# Patient Record
Sex: FEMALE | Race: ASIAN | NOT HISPANIC OR LATINO | Employment: FULL TIME | URBAN - METROPOLITAN AREA
[De-identification: names, ages, dates, MRNs, and addresses within clinical notes are randomized per-mention and may not be internally consistent; named-entity substitution may affect disease eponyms.]

---

## 2018-06-28 ENCOUNTER — OFFICE VISIT (OUTPATIENT)
Dept: MEDICAL GROUP | Facility: MEDICAL CENTER | Age: 38
End: 2018-06-28
Payer: COMMERCIAL

## 2018-06-28 VITALS
OXYGEN SATURATION: 100 % | TEMPERATURE: 98.9 F | HEIGHT: 66 IN | HEART RATE: 80 BPM | DIASTOLIC BLOOD PRESSURE: 62 MMHG | BODY MASS INDEX: 33.3 KG/M2 | SYSTOLIC BLOOD PRESSURE: 98 MMHG | WEIGHT: 207.23 LBS

## 2018-06-28 DIAGNOSIS — Z00.00 ANNUAL PHYSICAL EXAM: ICD-10-CM

## 2018-06-28 DIAGNOSIS — E55.9 HYPOVITAMINOSIS D: ICD-10-CM

## 2018-06-28 DIAGNOSIS — Z87.42 HX OF OVARIAN CYST: ICD-10-CM

## 2018-06-28 DIAGNOSIS — E66.9 OBESITY (BMI 30.0-34.9): ICD-10-CM

## 2018-06-28 DIAGNOSIS — Z00.00 HEALTH CARE MAINTENANCE: ICD-10-CM

## 2018-06-28 DIAGNOSIS — R74.01 ELEVATED TRANSAMINASE LEVEL: ICD-10-CM

## 2018-06-28 DIAGNOSIS — D72.829 LEUKOCYTOSIS, UNSPECIFIED TYPE: ICD-10-CM

## 2018-06-28 DIAGNOSIS — Z90.721 HX OF UNILATERAL OOPHORECTOMY: ICD-10-CM

## 2018-06-28 DIAGNOSIS — Z76.89 ENCOUNTER TO ESTABLISH CARE: ICD-10-CM

## 2018-06-28 PROBLEM — E66.811 OBESITY (BMI 30.0-34.9): Status: ACTIVE | Noted: 2018-06-28

## 2018-06-28 PROCEDURE — 99385 PREV VISIT NEW AGE 18-39: CPT | Mod: 25 | Performed by: INTERNAL MEDICINE

## 2018-06-28 PROCEDURE — 99204 OFFICE O/P NEW MOD 45 MIN: CPT | Performed by: INTERNAL MEDICINE

## 2018-06-28 ASSESSMENT — PATIENT HEALTH QUESTIONNAIRE - PHQ9: CLINICAL INTERPRETATION OF PHQ2 SCORE: 0

## 2018-06-28 NOTE — PROGRESS NOTES
CHIEF COMPLIANT:  Izzy Mills is a 37 y.o. female who presents for annual exam and discuss medical problems    Pt has GYN provider: yes    Recommendations:  Regular exercise at least 4 days a week  Diet: advised balanced diet  Dental exam at least 1-2 times per year  Sunscreen use: advised    Immunizations:  TdaP:  2014    GYN  Last PAP:   2016, normal   Abnormal PAP: no    Menarche:      Menses every month with bleeding for 3 days  No excess cramping or bleeding.   Takes OTC analgesics for cramping  Contraception: condoms    OBESITY, Body mass index is 33.45 kg/m².  Onset: since 2016  Diet:  vegetarian  Exercise: insufficient  No temperature intolerance. No change in hair/skin quality, BMs.   No HTN, buffalo hump, purple striae, flushing.  FH of obesity: mother    Hypovitaminosis D  The patient had low vitamin D level, treated with prescription dose.  No current supplement.    Elevated transaminases  The patient had elevated transaminases in the past.   She has not have fatigue, fever, abdominal pain, change in BM habits.  We do not have LFTs in Epic.    Leukocytosis  The patient had elevated WBC.   Denies fever, chills, anorexia, weight loss, lymphadenopathy.    H/o left ovarian cyst / oophorectomy  The patient had left ovarian cyst, status post oophorectomy on the left side in 2016.    CURRENT MEDICATIONS  Current Outpatient Prescriptions   Medication Sig Dispense Refill   • Cholecalciferol (VITAMIN D3) 51373 UNITS Cap Take 1 Cap by mouth every 7 days.     • Multiple Vitamins-Calcium (ONE-A-DAY WOMENS FORMULA PO) Take 1 Tab by mouth every day.       No current facility-administered medications for this visit.      ALLERGIES  Allergies: Patient has no known allergies.  PAST MEDICAL HISTORY  She  has a past medical history of Gynecological disorder; Jaundice; and Pain (09-).  SURGICAL HISTORY  She  has a past surgical history that includes dilation and curettage (); pelviscopy (N/A,  9/29/2016); ovarian cystectomy (Left, 9/29/2016); and oophorectomy (Left, 9/29/2016).  SOCIAL HISTORY  Social History   Substance Use Topics   • Smoking status: Never Smoker   • Smokeless tobacco: Never Used   • Alcohol use 0.0 oz/week      Comment: 1-2 per 6 months     Social History     Social History Narrative   • No narrative on file     FAMILY HISTORY  Family History   Problem Relation Age of Onset   • Diabetes Mother    • Asthma Mother    • Diabetes Father    • Prostate enlargement (BPH) Father    • Asthma Sister      Family Status   Relation Status   • Mother Alive   • Father Alive   • Sister Alive     REVIEW OF SYSTEMS  Constitutional: Denies fever, chills, or sweats  Skin: negative for rash, scaling, itching, pigmentation, hair or nail changes.  Eyes: negative for visual blurring, double vision, eye pain, floaters and discharge from eyes  ENT: negative for tinnitus, vertigo, bleeding gums, dental problem and hoarseness, frequent URI's, sinus trouble, persistent sore throat  Respiratory: negative for persistent cough, hemoptysis, dyspnea, recurrent pneumonia or bronchitis, asthma and wheezing  Cardiovascular: negative for palpitations, tachycardia, irregular heart beat, chest pain, or peripheral edema.  Gastrointestinal: negative for poor appetite, dysphagia, nausea, heartburn or reflux, abdominal pain, hemorrhoids, constipation or diarrhea. And per HPI.  Genitourinary: negative for dysuria, frequency, hesitancy, incontinence, sexually transmitted disease, abnormal vaginal discharge/bleeding, dysparunia. And per HPI.  Musculoskeletal: negative for joint swelling and muscle pain/ soreness  Neuro: negative for migraine headaches, involuntary movements or tremor  Psychiatric: negative for excessive alcohol consumption or illegal drug use, sleep problems, anxiety, depression, sexual difficulties  Hematologic/Lymphatic/Immunologic: negative for anemia, unusual bruising, swollen glands, HIV risk factors. And per  "HPI.  Endocrine: negative for temperature intolerance, polydipsia, polyuria.     PHYSICAL EXAMINATION:  Blood pressure (!) 98/62, pulse 80, temperature 37.2 °C (98.9 °F), height 1.676 m (5' 6\"), weight 94 kg (207 lb 3.7 oz), SpO2 100 %.  Body mass index is 33.45 kg/m².  Wt Readings from Last 4 Encounters:   06/28/18 94 kg (207 lb 3.7 oz)   09/29/16 79 kg (174 lb 2.6 oz)     General appearance:healthy, well developed, well nourished, well-groomed  Psych: alert, no distress, cooperative. Eye contact good, speech goal directed. Affect calm.   Eyes: conjunctivae and sclerae normal, lids and lashes normal, EOMI, PERRLA  ENT: Ears: external ears normal to inspection, canals clear. TMs pearly gray with normal light reflex and anatomic landmarks, Nose/Sinuses: nose shows no deformity, asymmetry, or inflammation, nasal mucosa normal, no sinus tenderness,   Oropharynx: lips normal without lesions, buccal mucosa normal, gums healthy, teeth intact, non-carious, palate normal, tongue midline and normal  Neck: no asymmetry, masses, adenopathy. Thyroid normal to palpation, carotids without bruits, no jugular venous distention  Lungs: clear to auscultation with good excursion, Normal respiratory rate. Chest symmetric no chest wall tenderness.  Cardiovascular: Regular rate and rhythm, no murmur.  No peripheral edema  Abdomen:  Soft, non-tender, no masses, HSM or palpable renal abnormalities. Bowel sounds normal, no bruits heard. No CVAT.  Musculoskeletal: no evidence of joint effusion, ROM of all joints is normal, no crepitation detected, strength grossly normal UE and LE, proximal and distal motor groups. No deformities present  Lymphatic: None significantly enlarged supraclavicular, axillary, or inguinal  Skin: color normal, vascularity normal, no rashes or suspicious lesions, no evidence of bleeding or bruising  Neuro: speech normal, mental status intact, gait grossly normal, muscle tone normal.    LABS  Reviewed and " discussed:  No results found for: CHOLSTRLTOT, LDL, HDL, TRIGLYCERIDE    Lab Results   Component Value Date/Time    SODIUM 138 09/20/2016 04:20 PM    POTASSIUM 3.8 09/20/2016 04:20 PM    CHLORIDE 107 09/20/2016 04:20 PM    CO2 24 09/20/2016 04:20 PM    GLUCOSE 89 09/20/2016 04:20 PM    BUN 9 09/20/2016 04:20 PM    CREATININE 0.77 09/20/2016 04:20 PM     ASSESSMENT/PLAN    1. Annual physical exam  Reviewed PMH, PSH, FH, SH, ALL, MEDS, HCM/IMM.   Advised healthy habits, diet, exercise.    2. Health care maintenance  Pending records  - TSH; Future    3. Encounter to establish care  Release form for old records: signed    4. Obesity (BMI 30.0-34.9)  - Patient identified as having weight management issue.  Appropriate orders and counseling given.  - TSH; Future    5. Hypovitaminosis D  Pending labs  - VITAMIN D,25 HYDROXY; Future    6. Elevated transaminase level  Pending labs  - COMP METABOLIC PANEL; Future  - LIPID PROFILE; Future    7. Leukocytosis, unspecified type  Pending labs  - CBC WITH DIFFERENTIAL; Future    8. Hx of unilateral oophorectomy  No sx.    9. Hx of ovarian cyst  No sx.    Smoking Counseling: Nonsmoker    Next office visit is due in  1 year    All questions are answered.     Please note that this dictation was created using voice recognition software. Despite all efforts, some minor grammar mistakes are possible.

## 2018-08-07 ENCOUNTER — OFFICE VISIT (OUTPATIENT)
Dept: MEDICAL GROUP | Facility: MEDICAL CENTER | Age: 38
End: 2018-08-07
Payer: COMMERCIAL

## 2018-08-07 VITALS
TEMPERATURE: 98.8 F | DIASTOLIC BLOOD PRESSURE: 64 MMHG | HEART RATE: 73 BPM | WEIGHT: 198.19 LBS | BODY MASS INDEX: 31.85 KG/M2 | HEIGHT: 66 IN | SYSTOLIC BLOOD PRESSURE: 100 MMHG | OXYGEN SATURATION: 97 %

## 2018-08-07 DIAGNOSIS — R71.8 MICROCYTOSIS: ICD-10-CM

## 2018-08-07 DIAGNOSIS — R73.01 IFG (IMPAIRED FASTING GLUCOSE): ICD-10-CM

## 2018-08-07 DIAGNOSIS — Z00.00 HEALTH CARE MAINTENANCE: ICD-10-CM

## 2018-08-07 DIAGNOSIS — E66.9 OBESITY (BMI 30.0-34.9): ICD-10-CM

## 2018-08-07 DIAGNOSIS — K59.4 PROCTALGIA FUGAX: ICD-10-CM

## 2018-08-07 DIAGNOSIS — E55.9 HYPOVITAMINOSIS D: ICD-10-CM

## 2018-08-07 PROCEDURE — 99214 OFFICE O/P EST MOD 30 MIN: CPT | Performed by: INTERNAL MEDICINE

## 2018-08-07 RX ORDER — FERROUS SULFATE 325(65) MG
325 TABLET ORAL 2 TIMES DAILY
Qty: 180 TAB | Refills: 0 | Status: SHIPPED | OUTPATIENT
Start: 2018-08-07 | End: 2020-01-13

## 2018-08-07 NOTE — PROGRESS NOTES
CHIEF COMPLAINT  Chief Complaint   Patient presents with   • Results     Lab results   • Rectal Pain     Wants colonscopy      HPI  Patient is a 37 y.o. female patient who presents today for the following     Proctalgia fugax  The patient has had intermittent severe rectal pain; it could awake her from sleep if happens overnight.   She has not have change in BMs, hematochezia/mucus in stool, anorexa, WT loss.   She was seen by GI, recommended colonoscopy.    Microcytosis  The patient had slightly lower MCV, without anemia.   She has have heavy periods.   Denies fatigue, anorexia, dizziness, lightheadedness.    IFG  The patient had elevated FBG at 107.  No polydipsia, polyphagia, polyuria.  No abdominal pain, weight loss, fatigue.  Diet: Regular  Exercise: Insufficient  BMI: 31  FH of DM: Parents    Hypovitaminosis D  The patient had low vitamin D level, treated with high-dose of vitamin D, finished in 3/2018.  No current vitamin D supplement.    OBESITY, BMI 31, was 33  Onset: since 2016  Diet: vegetarian  Exercise: insufficient  No temperature intolerance. No change in hair/skin quality, BMs.   No HTN, buffalo hump, purple striae, flushing.  FH of obesity: mother    Reviewed PMH, PSH, FH, SH, ALL, HCM/IMM  Reviewed MEDS    Patient Active Problem List    Diagnosis Date Noted   • Proctalgia fugax 08/07/2018   • IFG (impaired fasting glucose) 08/07/2018   • Health care maintenance 06/28/2018   • Obesity (BMI 30.0-34.9) 06/28/2018   • Ovarian retention cyst 09/29/2016     CURRENT MEDICATIONS  Current Outpatient Prescriptions   Medication Sig Dispense Refill   • ferrous sulfate 325 (65 Fe) MG tablet Take 1 Tab by mouth 2 Times a Day. 180 Tab 0   • Cholecalciferol (VITAMIN D3) 32316 UNITS Cap Take 1 Cap by mouth every 7 days.     • Multiple Vitamins-Calcium (ONE-A-DAY WOMENS FORMULA PO) Take 1 Tab by mouth every day.       No current facility-administered medications for this visit.      ALLERGIES  Allergies: Patient has  "no known allergies.  PAST MEDICAL HISTORY  Past Medical History:   Diagnosis Date   • Pain 09-    pelvic pain, 2/10   • Gynecological disorder     ovarian cyst   • Jaundice     at birth     SURGICAL HISTORY  She  has a past surgical history that includes dilation and curettage (); pelviscopy (N/A, 9/29/2016); ovarian cystectomy (Left, 9/29/2016); and oophorectomy (Left, 9/29/2016).  SOCIAL HISTORY  Social History   Substance Use Topics   • Smoking status: Never Smoker   • Smokeless tobacco: Never Used   • Alcohol use 0.0 oz/week      Comment: 1-2 per 6 months     Social History     Social History Narrative   • No narrative on file     FAMILY HISTORY  Family History   Problem Relation Age of Onset   • Diabetes Mother    • Asthma Mother    • Diabetes Father    • Prostate enlargement (BPH) Father    • Asthma Sister      Family Status   Relation Status   • Mo Alive   • Fa Alive   • Sis Alive     ROS   Constitutional: Negative for fever, chills.  HENT: Negative for congestion, sore throat.  Eyes: Negative for blurred vision.   Respiratory: Negative for cough, shortness of breath.  Cardiovascular: Negative for chest pain, palpitations.   Gastrointestinal: Negative for heartburn, nausea, and as above.  Genitourinary: Negative for dysuria.  Musculoskeletal: Negative for significant myalgias, back pain and joint pain.   Skin: Negative for rash and itching.   Neuro: Negative for dizziness, weakness and headaches.   Endo/Heme/Allergies: Does not bruise/bleed easily.   Psychiatric/Behavioral: Negative for depression, anxiety    PHYSICAL EXAM   Blood pressure 100/64, pulse 73, temperature 37.1 °C (98.8 °F), height 1.676 m (5' 6\"), weight 89.9 kg (198 lb 3.1 oz), SpO2 97 %. Body mass index is 31.99 kg/m².  General:  NAD, well appearing  HEENT:   NC/AT, PERRLA, EOMI, TMs are clear. Oropharyngeal mucosa is pink,  without lesions;  no cervical / supraclavicular  lymphadenopathy, no thyromegaly.    Cardiovascular: RRR.  "  No m/r/g. No carotid bruits .      Lungs:   CTAB, no w/r/r, no respiratory distress.  Abdomen: Soft, NT/ND + BS; no suprapubic tenderness; no masses or hepatosplenomegaly.  Extremities:  2+ DP and radial pulses bilaterally.  No c/c/e.   Skin:  Warm, dry.  No erythema. No rash.   Neurologic: Alert & oriented x 3.  No focal deficits.  Psychiatric:  Affect normal, mood normal, judgment normal.    LABS     Labs are reviewed and discussed with a patient  Lab Results   Component Value Date/Time    CHOLSTRLTOT 168 07/07/2018 08:56 AM    LDL 88 07/07/2018 08:56 AM    HDL 54 07/07/2018 08:56 AM    TRIGLYCERIDE 128 07/07/2018 08:56 AM       Lab Results   Component Value Date/Time    SODIUM 140 07/07/2018 08:56 AM    POTASSIUM 4.0 07/07/2018 08:56 AM    CHLORIDE 106 07/07/2018 08:56 AM    CO2 26 07/07/2018 08:56 AM    GLUCOSE 107 (H) 07/07/2018 08:56 AM    BUN 11 07/07/2018 08:56 AM    CREATININE 0.80 07/07/2018 08:56 AM     Lab Results   Component Value Date/Time    ALKPHOSPHAT 79 07/07/2018 08:56 AM    ASTSGOT 18 07/07/2018 08:56 AM    ALTSGPT 18 07/07/2018 08:56 AM    TBILIRUBIN 0.6 07/07/2018 08:56 AM      Lab Results   Component Value Date/Time    WBC 6.8 07/07/2018 08:56 AM    RBC 4.85 07/07/2018 08:56 AM    HEMOGLOBIN 13.2 07/07/2018 08:56 AM    HEMATOCRIT 39.4 07/07/2018 08:56 AM    MCV 81.2 (L) 07/07/2018 08:56 AM    MCH 27.2 07/07/2018 08:56 AM    MCHC 33.5 (L) 07/07/2018 08:56 AM    MPV 9.5 07/07/2018 08:56 AM    NEUTSPOLYS 54.70 07/07/2018 08:56 AM    LYMPHOCYTES 34.60 07/07/2018 08:56 AM    MONOCYTES 7.00 07/07/2018 08:56 AM    EOSINOPHILS 2.80 07/07/2018 08:56 AM    BASOPHILS 0.60 07/07/2018 08:56 AM      IMAGING     None    ASSESMENT AND PLAN        1. Proctalgia fugax  - REFERRAL TO GASTROENTEROLOGY    2. Microcytosis  Start iron for 3 months then follow-up labs  - ferrous sulfate 325 (65 Fe) MG tablet; Take 1 Tab by mouth 2 Times a Day.  Dispense: 180 Tab; Refill: 0  - CBC WITH DIFFERENTIAL; Future  -  IRON/TOTAL IRON BIND; Future    3. IFG (impaired fasting glucose)  Discussed about risk to develop DM.   Advised low carb diet, exercise, watch for WT.   A1c/CMP in not earlier than 6 months    4. Hypovitaminosis D  Advised to take 2000 units of vitamin D daily, OTC  - VITAMIN D,25 HYDROXY; Future    5. Obesity (BMI 30.0-34.9)  Appropriate counseling given    6. Health care maintenance  Pending records for Pap smear.    Counseling:   - Smoking:  Nonsmoker    Followup: Return in about 1 year (around 8/7/2019) for Short.    All questions are answered.    Please note that this dictation was created using voice recognition software, and that there might be errors of autumn and possibly content.

## 2019-05-14 ENCOUNTER — OFFICE VISIT (OUTPATIENT)
Dept: MEDICAL GROUP | Facility: MEDICAL CENTER | Age: 39
End: 2019-05-14
Payer: COMMERCIAL

## 2019-05-14 VITALS
SYSTOLIC BLOOD PRESSURE: 102 MMHG | HEIGHT: 66 IN | BODY MASS INDEX: 33.41 KG/M2 | OXYGEN SATURATION: 99 % | WEIGHT: 207.89 LBS | TEMPERATURE: 97.6 F | DIASTOLIC BLOOD PRESSURE: 68 MMHG | HEART RATE: 79 BPM

## 2019-05-14 DIAGNOSIS — E66.9 OBESITY (BMI 30.0-34.9): ICD-10-CM

## 2019-05-14 DIAGNOSIS — H81.10 BENIGN PAROXYSMAL POSITIONAL VERTIGO, UNSPECIFIED LATERALITY: ICD-10-CM

## 2019-05-14 DIAGNOSIS — I95.89 HYPOTENSION, CHRONIC: ICD-10-CM

## 2019-05-14 DIAGNOSIS — M62.838 MUSCLE SPASM: ICD-10-CM

## 2019-05-14 DIAGNOSIS — R11.0 NAUSEA: ICD-10-CM

## 2019-05-14 DIAGNOSIS — R51.9 NONINTRACTABLE EPISODIC HEADACHE, UNSPECIFIED HEADACHE TYPE: ICD-10-CM

## 2019-05-14 DIAGNOSIS — R42 LIGHT-HEADEDNESS: ICD-10-CM

## 2019-05-14 PROCEDURE — 99214 OFFICE O/P EST MOD 30 MIN: CPT | Performed by: INTERNAL MEDICINE

## 2019-05-14 RX ORDER — NAPROXEN 500 MG/1
500 TABLET ORAL 2 TIMES DAILY WITH MEALS
Qty: 60 TAB | Refills: 0 | Status: SHIPPED | OUTPATIENT
Start: 2019-05-14 | End: 2020-01-13

## 2019-05-14 RX ORDER — SUMATRIPTAN 25 MG/1
25-100 TABLET, FILM COATED ORAL
Qty: 10 TAB | Refills: 3 | Status: SHIPPED | OUTPATIENT
Start: 2019-05-14 | End: 2020-01-13

## 2019-05-14 RX ORDER — TIZANIDINE 4 MG/1
4 TABLET ORAL EVERY 6 HOURS PRN
Qty: 30 TAB | Refills: 3 | Status: SHIPPED | OUTPATIENT
Start: 2019-05-14 | End: 2020-01-13

## 2019-05-14 ASSESSMENT — PATIENT HEALTH QUESTIONNAIRE - PHQ9: CLINICAL INTERPRETATION OF PHQ2 SCORE: 0

## 2019-05-14 NOTE — PROGRESS NOTES
CHIEF COMPLAINT  Chief Complaint   Patient presents with   • Headache     x 1 week     HPI  Patient is a 38 y.o. female patient who presents today for the following     HA, hypotension, N  Lightheadedness, muscle spasm obesity  38-year-old, female with history of hypertension and obesity, developed headache that lasted 4 days, now still complains of lightheadedness, muscle spasm of the neck, initial nausea and dizziness, mainly with when turning her head.    Patient was wheezing, up to 8/10, now < 5/10.  Accompanied with intermittent photophobia.    She has not have:  -Trauma  -Recent/preceding or current upper respiratory infection  - Numbness, weakness, tingling  - Balance problems.    She used ibuprofen that did not help.  No family history of migraines    Reviewed PMH, PSH, FH, SH, ALL, HCM/IMM, no changes  Reviewed MEDS, no changes    Patient Active Problem List    Diagnosis Date Noted   • Proctalgia fugax 08/07/2018   • IFG (impaired fasting glucose) 08/07/2018   • Health care maintenance 06/28/2018   • Obesity (BMI 30.0-34.9) 06/28/2018   • Ovarian retention cyst 09/29/2016     CURRENT MEDICATIONS  Current Outpatient Prescriptions   Medication Sig Dispense Refill   • DEXTROMETHORPHAN POLISTIREX ER PO Take 5 mL/mL by mouth.     • SUMAtriptan (IMITREX) 25 MG Tab tablet Take 1-4 Tabs by mouth Once PRN for Migraine for up to 1 dose. 10 Tab 3   • ferrous sulfate 325 (65 Fe) MG tablet Take 1 Tab by mouth 2 Times a Day. 180 Tab 0   • Cholecalciferol (VITAMIN D3) 61888 UNITS Cap Take 1 Cap by mouth every 7 days.     • Multiple Vitamins-Calcium (ONE-A-DAY WOMENS FORMULA PO) Take 1 Tab by mouth every day.       No current facility-administered medications for this visit.      ALLERGIES  Allergies: Patient has no known allergies.  PAST MEDICAL HISTORY  Past Medical History:   Diagnosis Date   • Pain 09-    pelvic pain, 2/10   • Gynecological disorder     ovarian cyst   • Jaundice     at birth     SURGICAL  "HISTORY  She  has a past surgical history that includes dilation and curettage (); pelviscopy (N/A, 9/29/2016); ovarian cystectomy (Left, 9/29/2016); and oophorectomy (Left, 9/29/2016).  SOCIAL HISTORY  Social History   Substance Use Topics   • Smoking status: Never Smoker   • Smokeless tobacco: Never Used   • Alcohol use 0.0 oz/week      Comment: 1-2 per 6 months     Social History     Social History Narrative   • No narrative on file     FAMILY HISTORY  Family History   Problem Relation Age of Onset   • Diabetes Mother    • Asthma Mother    • Diabetes Father    • Prostate enlargement (BPH) Father    • Asthma Sister      Family Status   Relation Status   • Mo Alive   • Fa Alive   • Sis Alive     ROS   Constitutional: Negative for fever, chills.  HENT: Negative for congestion, sore throat.  Eyes: Negative for blurred vision.   Respiratory: Negative for cough, shortness of breath.  Cardiovascular: Negative for chest pain, palpitations.   Gastrointestinal: Negative for heartburn, V, abdominal pain.   Genitourinary: Negative for dysuria.  Musculoskeletal: As above.  Skin: Negative for rash.   Neuro: As above..   Endo/Heme/Allergies: Does not bruise/bleed easily.   Psychiatric/Behavioral: Negative for depression.    PHYSICAL EXAM   /68 (BP Location: Left arm, Patient Position: Sitting, BP Cuff Size: Adult)   Pulse 79   Temp 36.4 °C (97.6 °F) (Temporal)   Ht 1.676 m (5' 6\")   Wt 94.3 kg (207 lb 14.3 oz)   SpO2 99%  Body mass index is 33.55 kg/m².  General:  NAD, well appearing  HEENT:   NC/AT, PERRLA, EOMI, TMs are clear. Oropharyngeal mucosa is pink,  without lesions;  no cervical / supraclavicular  lymphadenopathy, no thyromegaly.    Cardiovascular: RRR.   No m/r/g. No carotid bruits .      Lungs:   CTAB, no w/r/r, no respiratory distress.  Abdomen: Soft, NT/ND + BS; unable to palpate liver/spleen due to WT.  Extremities:  2+ DP and radial pulses bilaterally.  No c/c/e.   Skin:  Warm, dry.  No " erythema. No rash.   Neurologic: Alert & oriented x 3. CN II-XII grossly intact. No focal deficits.  Psychiatric:  Affect normal, mood normal, judgment normal.    LABS     Labs are reviewed and discussed with a patient  Lab Results   Component Value Date/Time    CHOLSTRLTOT 168 07/07/2018 08:56 AM    LDL 88 07/07/2018 08:56 AM    HDL 54 07/07/2018 08:56 AM    TRIGLYCERIDE 128 07/07/2018 08:56 AM       Lab Results   Component Value Date/Time    SODIUM 140 07/07/2018 08:56 AM    POTASSIUM 4.0 07/07/2018 08:56 AM    CHLORIDE 106 07/07/2018 08:56 AM    CO2 26 07/07/2018 08:56 AM    GLUCOSE 107 (H) 07/07/2018 08:56 AM    BUN 11 07/07/2018 08:56 AM    CREATININE 0.80 07/07/2018 08:56 AM     Lab Results   Component Value Date/Time    ALKPHOSPHAT 79 07/07/2018 08:56 AM    ASTSGOT 18 07/07/2018 08:56 AM    ALTSGPT 18 07/07/2018 08:56 AM    TBILIRUBIN 0.6 07/07/2018 08:56 AM      Lab Results   Component Value Date/Time    WBC 6.8 07/07/2018 08:56 AM    RBC 4.85 07/07/2018 08:56 AM    HEMOGLOBIN 13.2 07/07/2018 08:56 AM    HEMATOCRIT 39.4 07/07/2018 08:56 AM    MCV 81.2 (L) 07/07/2018 08:56 AM    MCH 27.2 07/07/2018 08:56 AM    MCHC 33.5 (L) 07/07/2018 08:56 AM    MPV 9.5 07/07/2018 08:56 AM    NEUTSPOLYS 54.70 07/07/2018 08:56 AM    LYMPHOCYTES 34.60 07/07/2018 08:56 AM    MONOCYTES 7.00 07/07/2018 08:56 AM    EOSINOPHILS 2.80 07/07/2018 08:56 AM    BASOPHILS 0.60 07/07/2018 08:56 AM      IMAGING     None    ASSESMENT AND PLAN        1. Nonintractable episodic headache, unspecified headache type  This is new headache, significant intensity, accompanied with the above symptoms;   -Current BMI is 33,   - unclear etiology of HA    - CT-HEAD WITH & W/O; Future  - SUMAtriptan (IMITREX) 25 MG Tab tablet; Take 1-4 Tabs by mouth Once PRN for Migraine for up to 1 dose.  Dispense: 10 Tab; Refill: 3  - naproxen (NAPROSYN) 500 MG Tab; Take 1 Tab by mouth 2 times a day, with meals.  Dispense: 60 Tab; Refill: 0    2. Hypotension,  chronic  - CT-HEAD WITH & W/O; Future  3. Nausea  - CT-HEAD WITH & W/O; Future  4. Light-headedness  - CT-HEAD WITH & W/O; Future  As above    5. Muscle spasm  Massage can help  - tizanidine (ZANAFLEX) 4 MG Tab; Take 1 Tab by mouth every 6 hours as needed.  Dispense: 30 Tab; Refill: 3    6. Benign paroxysmal positional vertigo, unspecified laterality  - REFERRAL TO PHYSICAL THERAPY Reason for Therapy: Eval/Treat/Report    7. Obesity (BMI 30.0-34.9)  Requested referral  - OBESITY COUNSELING (No Charge): Patient identified as having weight management issue.  Appropriate orders and counseling given.  - REFERRAL TO [facility] (HIP) Services Requested: [facility] Weight Management Program; Reason for Referral? BMI>30; Reason for Visit: Overweight/Obesity    Counseling:   - Smoking:  Nonsmoker    Followup: Return in about 7 days (around 5/21/2019).  Labs and CATHY    All questions are answered.    Please note that this dictation was created using voice recognition software, and that there might be errors of autumn and possibly content.

## 2019-05-23 ENCOUNTER — APPOINTMENT (OUTPATIENT)
Dept: MEDICAL GROUP | Facility: MEDICAL CENTER | Age: 39
End: 2019-05-23
Payer: COMMERCIAL

## 2019-05-31 ENCOUNTER — HOSPITAL ENCOUNTER (OUTPATIENT)
Dept: RADIOLOGY | Facility: MEDICAL CENTER | Age: 39
End: 2019-05-31
Attending: INTERNAL MEDICINE
Payer: COMMERCIAL

## 2019-05-31 DIAGNOSIS — R51.9 NONINTRACTABLE EPISODIC HEADACHE, UNSPECIFIED HEADACHE TYPE: ICD-10-CM

## 2019-05-31 DIAGNOSIS — I95.89 HYPOTENSION, CHRONIC: ICD-10-CM

## 2019-05-31 DIAGNOSIS — R11.0 NAUSEA: ICD-10-CM

## 2019-05-31 DIAGNOSIS — R42 LIGHT-HEADEDNESS: ICD-10-CM

## 2019-05-31 PROCEDURE — 70470 CT HEAD/BRAIN W/O & W/DYE: CPT

## 2019-05-31 PROCEDURE — 700117 HCHG RX CONTRAST REV CODE 255: Performed by: INTERNAL MEDICINE

## 2019-05-31 RX ADMIN — IOHEXOL 100 ML: 350 INJECTION, SOLUTION INTRAVENOUS at 15:57

## 2019-06-11 ENCOUNTER — APPOINTMENT (OUTPATIENT)
Dept: PHYSICAL THERAPY | Facility: REHABILITATION | Age: 39
End: 2019-06-11
Attending: INTERNAL MEDICINE
Payer: COMMERCIAL

## 2019-07-01 ENCOUNTER — APPOINTMENT (OUTPATIENT)
Dept: HEALTH INFORMATION MANAGEMENT | Facility: MEDICAL CENTER | Age: 39
End: 2019-07-01
Payer: COMMERCIAL

## 2020-01-13 ENCOUNTER — OFFICE VISIT (OUTPATIENT)
Dept: URGENT CARE | Facility: CLINIC | Age: 40
End: 2020-01-13
Payer: COMMERCIAL

## 2020-01-13 VITALS
SYSTOLIC BLOOD PRESSURE: 120 MMHG | BODY MASS INDEX: 32.28 KG/M2 | RESPIRATION RATE: 18 BRPM | TEMPERATURE: 98.3 F | DIASTOLIC BLOOD PRESSURE: 75 MMHG | OXYGEN SATURATION: 96 % | WEIGHT: 200 LBS | HEART RATE: 80 BPM

## 2020-01-13 DIAGNOSIS — J02.9 PHARYNGITIS, UNSPECIFIED ETIOLOGY: Primary | ICD-10-CM

## 2020-01-13 LAB
INT CON NEG: NORMAL
INT CON POS: NORMAL
S PYO AG THROAT QL: NEGATIVE

## 2020-01-13 PROCEDURE — 99214 OFFICE O/P EST MOD 30 MIN: CPT | Performed by: PHYSICIAN ASSISTANT

## 2020-01-13 PROCEDURE — 87880 STREP A ASSAY W/OPTIC: CPT | Performed by: PHYSICIAN ASSISTANT

## 2020-01-13 RX ORDER — BENZONATATE 100 MG/1
100 CAPSULE ORAL 3 TIMES DAILY PRN
Qty: 15 CAP | Refills: 0 | Status: SHIPPED | OUTPATIENT
Start: 2020-01-13 | End: 2020-01-18

## 2020-01-13 ASSESSMENT — ENCOUNTER SYMPTOMS
SHORTNESS OF BREATH: 0
CHILLS: 0
SWOLLEN GLANDS: 1
COUGH: 1
SORE THROAT: 1
SPUTUM PRODUCTION: 0
FEVER: 0
NAUSEA: 0
ABDOMINAL PAIN: 0
CONSTIPATION: 0
HOARSE VOICE: 1
DIARRHEA: 0
VOMITING: 0

## 2020-01-13 NOTE — PROGRESS NOTES
Subjective:   Izzy Mills is a 39 y.o. female who presents for Pharyngitis (Couple days sorethroat, dry cough , headache, fever.)        Pharyngitis    This is a new problem. Episode onset: 3 days  The problem has been unchanged. The fever has been present for 1 to 2 days. Associated symptoms include congestion, coughing (Nonproductive ), a hoarse voice and swollen glands. Pertinent negatives include no abdominal pain, diarrhea, ear pain, plugged ear sensation, shortness of breath or vomiting. She has had no exposure to strep or mono. Treatments tried: Mtrin  The treatment provided mild relief.     No flu shot. No hx of asthma, bronchitis and pneumonia.     Review of Systems   Constitutional: Negative for chills, fever and malaise/fatigue.   HENT: Positive for congestion, hoarse voice and sore throat. Negative for ear pain.    Respiratory: Positive for cough (Nonproductive ). Negative for sputum production and shortness of breath.    Gastrointestinal: Negative for abdominal pain, constipation, diarrhea, nausea and vomiting.   All other systems reviewed and are negative.      PMH:  has a past medical history of Gynecological disorder, Jaundice, and Pain (09-).  MEDS:   Current Outpatient Medications:   •  benzonatate (TESSALON) 100 MG Cap, Take 1 Cap by mouth 3 times a day as needed for Cough for up to 5 days., Disp: 15 Cap, Rfl: 0  •  DEXTROMETHORPHAN POLISTIREX ER PO, Take 5 mL/mL by mouth., Disp: , Rfl:   •  Cholecalciferol (VITAMIN D3) 81204 UNITS Cap, Take 1 Cap by mouth every 7 days., Disp: , Rfl:   •  Multiple Vitamins-Calcium (ONE-A-DAY WOMENS FORMULA PO), Take 1 Tab by mouth every day., Disp: , Rfl:   ALLERGIES: No Known Allergies  SURGHX:   Past Surgical History:   Procedure Laterality Date   • PELVISCOPY N/A 9/29/2016    Procedure: PELVISCOPY;  Surgeon: Larisa Broussard M.D.;  Location: SURGERY SAME DAY [facility];  Service:    • OVARIAN CYSTECTOMY Left 9/29/2016    Procedure: OVARIAN  CYSTECTOMY;  Surgeon: Larisa Broussard M.D.;  Location: SURGERY SAME DAY [facility];  Service:    • OOPHORECTOMY Left 2016    Procedure: Salpingo-OOPHORECTOMY;  Surgeon: Larisa Broussard M.D.;  Location: SURGERY SAME DAY [facility];  Service:    • DILATION AND CURETTAGE      D & C     SOCHX:  reports that she has never smoked. She has never used smokeless tobacco. She reports current alcohol use. She reports that she does not use drugs.  Family History   Problem Relation Age of Onset   • Diabetes Mother    • Asthma Mother    • Diabetes Father    • Prostate enlargement (BPH) Father    • Asthma Sister         Objective:   /75   Pulse 80   Temp 36.8 °C (98.3 °F) (Temporal)   Resp 18   Wt 90.7 kg (200 lb)   SpO2 96%   BMI 32.28 kg/m²     Physical Exam  Vitals signs reviewed.   Constitutional:       General: She is not in acute distress.     Appearance: She is well-developed.   HENT:      Head: Normocephalic and atraumatic.      Right Ear: Tympanic membrane and external ear normal.      Left Ear: Tympanic membrane and external ear normal.      Nose: Mucosal edema and congestion present.      Mouth/Throat:      Mouth: Mucous membranes are moist.      Pharynx: Oropharynx is clear.      Tonsils: No tonsillar exudate. Swellin+ on the right. 1+ on the left.   Eyes:      Conjunctiva/sclera: Conjunctivae normal.      Pupils: Pupils are equal, round, and reactive to light.   Neck:      Musculoskeletal: Normal range of motion and neck supple.      Trachea: No tracheal deviation.   Cardiovascular:      Rate and Rhythm: Normal rate and regular rhythm.   Pulmonary:      Effort: Pulmonary effort is normal.      Breath sounds: Normal breath sounds.   Skin:     General: Skin is warm and dry.      Capillary Refill: Capillary refill takes less than 2 seconds.   Neurological:      General: No focal deficit present.      Mental Status: She is alert and oriented to person, place, and time.   Psychiatric:          Mood and Affect: Mood normal.         Behavior: Behavior normal.        Rapid strep: neg      Assessment/Plan:     1. Pharyngitis, unspecified etiology  POCT Rapid Strep A    benzonatate (TESSALON) 100 MG Cap     We discussed sx are likely due to viral etiology. Supportive care reviewed. Start warm salt water gargles. Alternate tylenol and mortin. Sore throat handout provided.     Follow-up with primary care provider within 7-10 days.  If symptoms worsen or persist patient can return to clinic for reevaluation. All side effects of medication discussed including allergic response, GI upset, tendon injury, etc. Patient confirmed understanding of information.    Please note that this dictation was created using voice recognition software. I have made every reasonable attempt to correct obvious errors, but I expect that there are errors of grammar and possibly content that I did not discover before finalizing the note.

## 2020-01-13 NOTE — LETTER
January 13, 2020         Patient: Izzy Mills   YOB: 1980   Date of Visit: 1/13/2020           To Whom it May Concern:    Izzy Mills was seen in my clinic on 1/13/2020. She may return to work on 1/15/20 or sooner if symptoms improve.    If you have any questions or concerns, please don't hesitate to call.        Sincerely,           Lennie Black P.A.-C.  Electronically Signed

## 2020-09-11 ENCOUNTER — TELEPHONE (OUTPATIENT)
Dept: MEDICAL GROUP | Age: 40
End: 2020-09-11

## 2020-09-11 DIAGNOSIS — E78.5 DYSLIPIDEMIA: ICD-10-CM

## 2020-09-11 DIAGNOSIS — E55.9 HYPOVITAMINOSIS D: ICD-10-CM

## 2020-09-11 DIAGNOSIS — R73.01 IFG (IMPAIRED FASTING GLUCOSE): ICD-10-CM

## 2020-09-11 DIAGNOSIS — R53.83 FATIGUE, UNSPECIFIED TYPE: ICD-10-CM

## 2020-09-11 NOTE — TELEPHONE ENCOUNTER
1. Caller Name: Izzy Mills                        Call Back Number: [phone] (home)       How would the patient prefer to be contacted with a response: Phone call do NOT leave a detailed message    2. SPECIFIC Action To Be Taken: Pt. requesting lab orders before apt. 09/22/2020    3. Diagnosis/Clinical Reason for Request: Annual labs     4. Specialty & Provider Name/Lab/Imaging Location: Lab    5. Is appointment scheduled for requested order/referral: yes - 09/22/2020    Patient was not informed they will receive a return phone call from the office ONLY if there are any questions before processing their request. Advised to call back if they haven't received a call from the referral department in 5 days.

## 2020-09-18 ENCOUNTER — HOSPITAL ENCOUNTER (OUTPATIENT)
Dept: LAB | Facility: MEDICAL CENTER | Age: 40
End: 2020-09-18
Attending: INTERNAL MEDICINE
Payer: COMMERCIAL

## 2020-09-18 DIAGNOSIS — R73.01 IFG (IMPAIRED FASTING GLUCOSE): ICD-10-CM

## 2020-09-18 DIAGNOSIS — E78.5 DYSLIPIDEMIA: ICD-10-CM

## 2020-09-18 DIAGNOSIS — R53.83 FATIGUE, UNSPECIFIED TYPE: ICD-10-CM

## 2020-09-18 DIAGNOSIS — E55.9 HYPOVITAMINOSIS D: ICD-10-CM

## 2020-09-18 LAB
25(OH)D3 SERPL-MCNC: 21 NG/ML (ref 30–100)
ALBUMIN SERPL BCP-MCNC: 4 G/DL (ref 3.2–4.9)
ALBUMIN/GLOB SERPL: 1.5 G/DL
ALP SERPL-CCNC: 85 U/L (ref 30–99)
ALT SERPL-CCNC: 24 U/L (ref 2–50)
ANION GAP SERPL CALC-SCNC: 12 MMOL/L (ref 7–16)
AST SERPL-CCNC: 28 U/L (ref 12–45)
BASOPHILS # BLD AUTO: 0.3 % (ref 0–1.8)
BASOPHILS # BLD: 0.02 K/UL (ref 0–0.12)
BILIRUB SERPL-MCNC: 0.5 MG/DL (ref 0.1–1.5)
BUN SERPL-MCNC: 10 MG/DL (ref 8–22)
CALCIUM SERPL-MCNC: 9.1 MG/DL (ref 8.5–10.5)
CHLORIDE SERPL-SCNC: 102 MMOL/L (ref 96–112)
CHOLEST SERPL-MCNC: 161 MG/DL (ref 100–199)
CO2 SERPL-SCNC: 21 MMOL/L (ref 20–33)
CREAT SERPL-MCNC: 0.74 MG/DL (ref 0.5–1.4)
EOSINOPHIL # BLD AUTO: 0.2 K/UL (ref 0–0.51)
EOSINOPHIL NFR BLD: 2.8 % (ref 0–6.9)
ERYTHROCYTE [DISTWIDTH] IN BLOOD BY AUTOMATED COUNT: 43.3 FL (ref 35.9–50)
FASTING STATUS PATIENT QL REPORTED: NORMAL
GLOBULIN SER CALC-MCNC: 2.7 G/DL (ref 1.9–3.5)
GLUCOSE SERPL-MCNC: 111 MG/DL (ref 65–99)
HCT VFR BLD AUTO: 39 % (ref 37–47)
HDLC SERPL-MCNC: 54 MG/DL
HGB BLD-MCNC: 12.4 G/DL (ref 12–16)
IMM GRANULOCYTES # BLD AUTO: 0.02 K/UL (ref 0–0.11)
IMM GRANULOCYTES NFR BLD AUTO: 0.3 % (ref 0–0.9)
LDLC SERPL CALC-MCNC: 84 MG/DL
LYMPHOCYTES # BLD AUTO: 2.8 K/UL (ref 1–4.8)
LYMPHOCYTES NFR BLD: 38.7 % (ref 22–41)
MCH RBC QN AUTO: 26.4 PG (ref 27–33)
MCHC RBC AUTO-ENTMCNC: 31.8 G/DL (ref 33.6–35)
MCV RBC AUTO: 83 FL (ref 81.4–97.8)
MONOCYTES # BLD AUTO: 0.65 K/UL (ref 0–0.85)
MONOCYTES NFR BLD AUTO: 9 % (ref 0–13.4)
NEUTROPHILS # BLD AUTO: 3.55 K/UL (ref 2–7.15)
NEUTROPHILS NFR BLD: 48.9 % (ref 44–72)
NRBC # BLD AUTO: 0 K/UL
NRBC BLD-RTO: 0 /100 WBC
PLATELET # BLD AUTO: 364 K/UL (ref 164–446)
PMV BLD AUTO: 10.5 FL (ref 9–12.9)
POTASSIUM SERPL-SCNC: 4.2 MMOL/L (ref 3.6–5.5)
PROT SERPL-MCNC: 6.7 G/DL (ref 6–8.2)
RBC # BLD AUTO: 4.7 M/UL (ref 4.2–5.4)
SODIUM SERPL-SCNC: 135 MMOL/L (ref 135–145)
TRIGL SERPL-MCNC: 115 MG/DL (ref 0–149)
TSH SERPL DL<=0.005 MIU/L-ACNC: 2.51 UIU/ML (ref 0.38–5.33)
WBC # BLD AUTO: 7.2 K/UL (ref 4.8–10.8)

## 2020-09-18 PROCEDURE — 36415 COLL VENOUS BLD VENIPUNCTURE: CPT

## 2020-09-18 PROCEDURE — 82306 VITAMIN D 25 HYDROXY: CPT

## 2020-09-18 PROCEDURE — 80053 COMPREHEN METABOLIC PANEL: CPT

## 2020-09-18 PROCEDURE — 85025 COMPLETE CBC W/AUTO DIFF WBC: CPT

## 2020-09-18 PROCEDURE — 84443 ASSAY THYROID STIM HORMONE: CPT

## 2020-09-18 PROCEDURE — 80061 LIPID PANEL: CPT

## 2020-09-18 PROCEDURE — 83036 HEMOGLOBIN GLYCOSYLATED A1C: CPT

## 2020-09-19 LAB
EST. AVERAGE GLUCOSE BLD GHB EST-MCNC: 123 MG/DL
HBA1C MFR BLD: 5.9 % (ref 0–5.6)

## 2020-09-22 ENCOUNTER — HOSPITAL ENCOUNTER (OUTPATIENT)
Facility: MEDICAL CENTER | Age: 40
End: 2020-09-22
Attending: INTERNAL MEDICINE
Payer: COMMERCIAL

## 2020-09-22 ENCOUNTER — OFFICE VISIT (OUTPATIENT)
Dept: MEDICAL GROUP | Age: 40
End: 2020-09-22
Payer: COMMERCIAL

## 2020-09-22 VITALS
HEIGHT: 66 IN | OXYGEN SATURATION: 98 % | WEIGHT: 216.6 LBS | TEMPERATURE: 98.4 F | DIASTOLIC BLOOD PRESSURE: 74 MMHG | HEART RATE: 66 BPM | SYSTOLIC BLOOD PRESSURE: 112 MMHG | BODY MASS INDEX: 34.81 KG/M2

## 2020-09-22 DIAGNOSIS — R73.01 IMPAIRED FASTING GLUCOSE: ICD-10-CM

## 2020-09-22 DIAGNOSIS — Z12.4 SCREENING FOR MALIGNANT NEOPLASM OF CERVIX: ICD-10-CM

## 2020-09-22 DIAGNOSIS — Z01.419 WELL FEMALE EXAM WITH ROUTINE GYNECOLOGICAL EXAM: ICD-10-CM

## 2020-09-22 DIAGNOSIS — K59.4 PROCTALGIA FUGAX: ICD-10-CM

## 2020-09-22 DIAGNOSIS — Z00.00 HEALTH CARE MAINTENANCE: ICD-10-CM

## 2020-09-22 DIAGNOSIS — E66.9 OBESITY (BMI 30.0-34.9): ICD-10-CM

## 2020-09-22 DIAGNOSIS — N83.209 CYST OF OVARY, UNSPECIFIED LATERALITY: ICD-10-CM

## 2020-09-22 DIAGNOSIS — E55.9 HYPOVITAMINOSIS D: ICD-10-CM

## 2020-09-22 PROCEDURE — 99000 SPECIMEN HANDLING OFFICE-LAB: CPT | Performed by: INTERNAL MEDICINE

## 2020-09-22 PROCEDURE — 88175 CYTOPATH C/V AUTO FLUID REDO: CPT

## 2020-09-22 PROCEDURE — 99214 OFFICE O/P EST MOD 30 MIN: CPT | Mod: 25 | Performed by: INTERNAL MEDICINE

## 2020-09-22 PROCEDURE — 87624 HPV HI-RISK TYP POOLED RSLT: CPT

## 2020-09-22 PROCEDURE — 99396 PREV VISIT EST AGE 40-64: CPT | Performed by: INTERNAL MEDICINE

## 2020-09-22 RX ORDER — PHENTERMINE HYDROCHLORIDE 37.5 MG/1
37.5 TABLET ORAL
Qty: 30 TAB | Refills: 0 | Status: SHIPPED | OUTPATIENT
Start: 2020-09-22 | End: 2020-10-22

## 2020-09-22 ASSESSMENT — FIBROSIS 4 INDEX: FIB4 SCORE: 0.63

## 2020-09-22 ASSESSMENT — PATIENT HEALTH QUESTIONNAIRE - PHQ9: CLINICAL INTERPRETATION OF PHQ2 SCORE: 0

## 2020-09-22 NOTE — PROGRESS NOTES
CHIEF COMPLAINT  Chief Complaint   Patient presents with   • Annual Exam   • Gynecologic Exam   • Weight Gain   • Lab Results     HPI  Izzy Mills is a 39 y.o. female who presents today for the following     Recommendations:  Regular exercise at least 4 days a week  Diet: advised balanced diet  Dental exam at least 1-2 times per year  Sunscreen use: advised     Immunizations:  TdaP:  2014  Influenza: NA     GYN  Last PAP:   9/2020, normal   Abnormal PAP: no     Menarche:      Menses every month with bleeding for 3 days  No excess cramping or bleeding.   Takes OTC analgesics for cramping  Contraception: condoms    IFG  The patient had elevated FBG.  No polydipsia, polyphagia, polyuria.  No abdominal pain, weight loss, fatigue.  Diet: Regular  Exercise: Insufficient  BMI: 34  FH of DM: parents    Hypovitaminosis D  The patient had low vitamin D level, treated with prescription dose.  No current supplement.    H/o left ovarian cyst, (oophorectomy), proctalgia  The patient had left ovarian cyst, status post oophorectomy on the left side in 2016.  C/o intermittent proctalgia.     OBESITY, Body mass index is 34.96 kg/m².  Onset: since 2016  Diet:  vegetarian  Exercise: insufficient  No temperature intolerance. No change in hair/skin quality, BMs.   No HTN, buffalo hump, purple striae, flushing.  FH of obesity: mother     Reviewed PMH, PSH, FH, SH, ALL, HCM/IMM, no changes  Reviewed MEDS, no changes    Patient Active Problem List    Diagnosis Date Noted   • Proctalgia fugax 08/07/2018   • IFG (impaired fasting glucose) 08/07/2018   • Health care maintenance 06/28/2018   • Obesity (BMI 30.0-34.9) 06/28/2018   • Ovarian retention cyst 09/29/2016     CURRENT MEDICATIONS  Current Outpatient Medications   Medication Sig Dispense Refill   • phentermine (ADIPEX-P) 37.5 MG tablet Take 1 Tab by mouth every morning before breakfast for 30 days. 30 Tab 0   • DEXTROMETHORPHAN POLISTIREX ER PO Take 5 mL/mL by mouth.     •  Cholecalciferol (VITAMIN D3) 92894 UNITS Cap Take 1 Cap by mouth every 7 days.     • Multiple Vitamins-Calcium (ONE-A-DAY WOMENS FORMULA PO) Take 1 Tab by mouth every day.       No current facility-administered medications for this visit.      ALLERGIES  Allergies: Patient has no known allergies.  PAST MEDICAL HISTORY  Past Medical History:   Diagnosis Date   • Pain 09-    pelvic pain, 2/10   • Gynecological disorder     ovarian cyst   • Jaundice     at birth     SURGICAL HISTORY  She  has a past surgical history that includes dilation and curettage (); pelviscopy (N/A, 9/29/2016); ovarian cystectomy (Left, 9/29/2016); and oophorectomy (Left, 9/29/2016).  SOCIAL HISTORY  Social History     Tobacco Use   • Smoking status: Never Smoker   • Smokeless tobacco: Never Used   Substance Use Topics   • Alcohol use: Yes     Alcohol/week: 0.0 oz     Comment: 1-2 per 6 months   • Drug use: No     Social History     Social History Narrative   • Not on file     FAMILY HISTORY  Family History   Problem Relation Age of Onset   • Diabetes Mother    • Asthma Mother    • Diabetes Father    • Prostate enlargement (BPH) Father    • Asthma Sister      Family Status   Relation Name Status   • Mo  Alive   • Fa  Alive   • Sis  Alive     ROS   Constitutional: Negative for fever, chills, fatigue.  HENT: Negative for congestion, sore throat.  Eyes: Negative for vision problems.   Respiratory: Negative for cough, shortness of breath.  Cardiovascular: Negative for chest pain, palpitations.   Gastrointestinal: Negative for heartburn, nausea, abdominal pain.   Genitourinary: Negative for dysuria.  Musculoskeletal: Negative for significant myalgia, back and joint pain.   Skin: Negative for rash.   Neuro: Negative for dizziness, weakness and headaches.   Endo/Heme/Allergies: Does not bruise/bleed easily.   Psychiatric/Behavioral: Negative for depression.    PHYSICAL EXAM   Blood Pressure 112/74 (BP Location: Left arm, Patient Position:  "Sitting, BP Cuff Size: Adult)   Pulse 66   Temperature 36.9 °C (98.4 °F) (Temporal)   Height 1.676 m (5' 6\")   Weight 98.2 kg (216 lb 9.6 oz)   Oxygen Saturation 98%  Body mass index is 34.96 kg/m².  General:  NAD, well appearing  HEENT:   NC/AT, PERRLA, EOMI.  Cardiovascular: unlabored breathing, no peripheral cyanosis or swelling.     Lungs:   no respiratory distress.  Abdomen: non- distended.  Extremities:  No LE swelling.  Skin:  Warm, dry.  No erythema. No rash.   Neurologic: Alert & oriented x 3. CN II-XII grossly intact. No focal deficits.  Psychiatric:  Affect normal, mood normal, judgment normal.  GYN: No suprapubic tenderness.  Perineum and external genitalia normal without rash.   Vagina with without discharge, normal mucosa.  Cervix w/o visible lesions or discharge. No CMT  Uterus normal size, non-tender, no masses,   Adnexa w/o mass or tenderness.   PAP smear was obtained.    Labs     Labs are reviewed and discussed with a patient  Lab Results   Component Value Date/Time    CHOLSTRLTOT 161 09/18/2020 11:01 AM    LDL 84 09/18/2020 11:01 AM    HDL 54 09/18/2020 11:01 AM    TRIGLYCERIDE 115 09/18/2020 11:01 AM       Lab Results   Component Value Date/Time    SODIUM 135 09/18/2020 11:01 AM    POTASSIUM 4.2 09/18/2020 11:01 AM    CHLORIDE 102 09/18/2020 11:01 AM    CO2 21 09/18/2020 11:01 AM    GLUCOSE 111 (H) 09/18/2020 11:01 AM    BUN 10 09/18/2020 11:01 AM    CREATININE 0.74 09/18/2020 11:01 AM     Lab Results   Component Value Date/Time    ALKPHOSPHAT 85 09/18/2020 11:01 AM    ASTSGOT 28 09/18/2020 11:01 AM    ALTSGPT 24 09/18/2020 11:01 AM    TBILIRUBIN 0.5 09/18/2020 11:01 AM      Lab Results   Component Value Date/Time    HBA1C 5.9 (H) 09/18/2020 11:01 AM     No results found for: TSH  No results found for: FREET4    Lab Results   Component Value Date/Time    WBC 7.2 09/18/2020 11:01 AM    RBC 4.70 09/18/2020 11:01 AM    HEMOGLOBIN 12.4 09/18/2020 11:01 AM    HEMATOCRIT 39.0 09/18/2020 11:01 AM "    MCV 83.0 09/18/2020 11:01 AM    MCH 26.4 (L) 09/18/2020 11:01 AM    MCHC 31.8 (L) 09/18/2020 11:01 AM    MPV 10.5 09/18/2020 11:01 AM    NEUTSPOLYS 48.90 09/18/2020 11:01 AM    LYMPHOCYTES 38.70 09/18/2020 11:01 AM    MONOCYTES 9.00 09/18/2020 11:01 AM    EOSINOPHILS 2.80 09/18/2020 11:01 AM    BASOPHILS 0.30 09/18/2020 11:01 AM      Imaging     None    Assessment and Plan     Izzy Mills is a 39 y.o. female    1. Well female exam with routine gynecological exam  Reviewed PMH, PSH, FH, SH, ALL, MEDS, HCM/IMM.   - THINPREP PAP WITH HPV; Future  2. Screening for malignant neoplasm of cervix  - THINPREP PAP WITH HPV; Future  3. Health care maintenance  - THINPREP PAP WITH HPV; Future    4. IFG (impaired fasting glucose)  Discussed about risk to develop DM.   Advised low carb diet, exercise, watch for WT.   - Comp Metabolic Panel; Future  - HEMOGLOBIN A1C; Future    5. Hypovitaminosis D  Started high dose vitamin D  - VITAMIN D,25 HYDROXY; Future    6. Cyst of ovary, unspecified laterality  S/p oophorectomy    7. Proctalgia fugax  No constipation    8. Obesity (BMI 30.0-34.9)  - Patient identified as having weight management issue.  Appropriate orders and counseling given.  - phentermine (ADIPEX-P) 37.5 MG tablet; Take 1 Tab by mouth every morning before breakfast for 30 days.  Dispense: 30 Tab; Refill: 0    Counseling:   - Smoking:  Nonsmoker    Followup: Return in about 4 weeks (around 10/20/2020) for Phentermine.    All questions are answered.    Please note that this dictation was created using voice recognition software, and that there might be errors of autumn and possibly content.

## 2020-09-23 ENCOUNTER — TELEPHONE (OUTPATIENT)
Dept: MEDICAL GROUP | Age: 40
End: 2020-09-23

## 2020-09-23 DIAGNOSIS — Z00.00 HEALTH CARE MAINTENANCE: ICD-10-CM

## 2020-09-23 DIAGNOSIS — Z12.4 SCREENING FOR MALIGNANT NEOPLASM OF CERVIX: ICD-10-CM

## 2020-09-23 DIAGNOSIS — Z01.419 WELL FEMALE EXAM WITH ROUTINE GYNECOLOGICAL EXAM: ICD-10-CM

## 2020-09-23 LAB
CYTOLOGY REG CYTOL: NORMAL
HPV HR 12 DNA CVX QL NAA+PROBE: NEGATIVE
HPV16 DNA SPEC QL NAA+PROBE: NEGATIVE
HPV18 DNA SPEC QL NAA+PROBE: NEGATIVE
SPECIMEN SOURCE: NORMAL

## 2020-09-23 NOTE — TELEPHONE ENCOUNTER
Phone Number Called: [phone] (home)       Call outcome: Did not leave a detailed message. Requested patient to call back.    Message: LVM for patient to call and schedule her 6 month appointment for March 2021.

## 2020-09-24 RX ORDER — CHOLECALCIFEROL (VITAMIN D3) 1250 MCG
1 CAPSULE ORAL
Qty: 10 CAP | Refills: 0 | Status: SHIPPED | OUTPATIENT
Start: 2020-09-24 | End: 2020-12-04 | Stop reason: SDUPTHER

## 2020-09-29 ENCOUNTER — TELEPHONE (OUTPATIENT)
Dept: MEDICAL GROUP | Age: 40
End: 2020-09-29

## 2020-09-29 NOTE — TELEPHONE ENCOUNTER
Phone Number Called: [phone]    Call outcome: Did not leave a detailed message. Requested patient to call back.    Message: PAP results negative per Dr. Díaz. 1st attempt

## 2020-09-29 NOTE — TELEPHONE ENCOUNTER
----- Message from Ana Ramsey M.D. sent at 9/27/2020  4:08 PM PDT -----  Please notify pt that PAP was neg, thanks, Dr Díaz

## 2020-09-30 NOTE — TELEPHONE ENCOUNTER
Called patient to let her know her Pap was negative. Patient did not have any other questions or concerns.

## 2020-10-09 ENCOUNTER — HOSPITAL ENCOUNTER (OUTPATIENT)
Dept: RADIOLOGY | Facility: MEDICAL CENTER | Age: 40
End: 2020-10-09
Attending: INTERNAL MEDICINE
Payer: COMMERCIAL

## 2020-10-09 DIAGNOSIS — Z12.31 VISIT FOR SCREENING MAMMOGRAM: ICD-10-CM

## 2020-10-09 PROCEDURE — 77067 SCR MAMMO BI INCL CAD: CPT

## 2020-12-04 RX ORDER — CHOLECALCIFEROL (VITAMIN D3) 1250 MCG
1 CAPSULE ORAL
Qty: 12 CAP | Refills: 1 | Status: SHIPPED | OUTPATIENT
Start: 2020-12-04 | End: 2021-03-22 | Stop reason: SDUPTHER

## 2021-03-22 ENCOUNTER — OFFICE VISIT (OUTPATIENT)
Dept: MEDICAL GROUP | Age: 41
End: 2021-03-22
Payer: COMMERCIAL

## 2021-03-22 VITALS
TEMPERATURE: 98.1 F | DIASTOLIC BLOOD PRESSURE: 68 MMHG | HEIGHT: 66 IN | HEART RATE: 91 BPM | WEIGHT: 201 LBS | BODY MASS INDEX: 32.3 KG/M2 | OXYGEN SATURATION: 99 % | SYSTOLIC BLOOD PRESSURE: 98 MMHG

## 2021-03-22 DIAGNOSIS — Z12.31 ENCOUNTER FOR SCREENING MAMMOGRAM FOR MALIGNANT NEOPLASM OF BREAST: ICD-10-CM

## 2021-03-22 DIAGNOSIS — R73.01 IMPAIRED FASTING GLUCOSE: ICD-10-CM

## 2021-03-22 DIAGNOSIS — K62.89 PROCTALGIA: ICD-10-CM

## 2021-03-22 DIAGNOSIS — E55.9 HYPOVITAMINOSIS D: ICD-10-CM

## 2021-03-22 DIAGNOSIS — N83.202 CYST OF LEFT OVARY: ICD-10-CM

## 2021-03-22 DIAGNOSIS — E66.9 OBESITY (BMI 30.0-34.9): ICD-10-CM

## 2021-03-22 PROCEDURE — 99214 OFFICE O/P EST MOD 30 MIN: CPT | Performed by: INTERNAL MEDICINE

## 2021-03-22 RX ORDER — CHOLECALCIFEROL (VITAMIN D3) 1250 MCG
1 CAPSULE ORAL
Qty: 12 CAPSULE | Refills: 1 | Status: SHIPPED | OUTPATIENT
Start: 2021-03-22 | End: 2022-11-11

## 2021-03-22 ASSESSMENT — PATIENT HEALTH QUESTIONNAIRE - PHQ9: CLINICAL INTERPRETATION OF PHQ2 SCORE: 0

## 2021-03-22 ASSESSMENT — FIBROSIS 4 INDEX: FIB4 SCORE: 0.64

## 2021-03-22 NOTE — PROGRESS NOTES
CHIEF COMPLAINT  Chief Complaint   Patient presents with   • Follow-Up     6 month   • Medication Refill     matias d     HPI  Izzy Mills is a 40 y.o. female who presents today for the following    IFG  The patient had elevated FBG.  No polydipsia, polyphagia, polyuria.  No abdominal pain, weight loss, fatigue.  Diet: Regular  Exercise: Insufficient  BMI: 32  FH of DM: parents     Hypovitaminosis D  The patient had low vitamin D level, treated with prescription dose.  Vurrent supplement: high dose.     H/o left ovarian cyst, (oophorectomy), proctalgia  The patient had left ovarian cyst, status post oophorectomy on the left side in 2016.  C/o intermittent proctalgia.     OBESITY, Body mass index is 32, was 34  Onset: since 2016  Diet:  vegetarian  Exercise: insufficient  No temperature intolerance. No change in hair/skin quality, BMs.   No HTN, buffalo hump, purple striae, flushing.  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 12 Cap 1   • Multiple Vitamins-Calcium (ONE-A-DAY WOMENS FORMULA PO) Take 1 Tab by mouth every day.     • DEXTROMETHORPHAN POLISTIREX ER PO Take 5 mL/mL by mouth.       No current facility-administered medications for this visit.     ALLERGIES  Allergies: Patient has no known allergies.  PAST MEDICAL HISTORY  Past Medical History:   Diagnosis Date   • Pain 09-    pelvic pain, 2/10   • Gynecological disorder     ovarian cyst   • Jaundice     at birth     SURGICAL HISTORY  She  has a past surgical  "history that includes dilation and curettage (); pelviscopy (N/A, 9/29/2016); ovarian cystectomy (Left, 9/29/2016); and oophorectomy (Left, 9/29/2016).  SOCIAL HISTORY  Social History     Tobacco Use   • Smoking status: Never Smoker   • Smokeless tobacco: Never Used   Substance Use Topics   • Alcohol use: Yes     Alcohol/week: 0.0 oz     Comment: 1-2 per 6 months   • Drug use: No     Social History     Social History Narrative   • Not on file     FAMILY HISTORY  Family History   Problem Relation Age of Onset   • Diabetes Mother    • Asthma Mother    • Diabetes Father    • Prostate enlargement (BPH) Father    • Asthma Sister      Family Status   Relation Name Status   • Mo  Alive   • Fa  Alive   • Sis  Alive     ROS   Constitutional: Negative for fever, chills, fatigue.  HENT: Negative for congestion, sore throat.  Eyes: Negative for vision problems.   Respiratory: Negative for cough, shortness of breath.  Cardiovascular: Negative for chest pain, palpitations.   Gastrointestinal: Negative for heartburn, nausea, abdominal pain.   Genitourinary: Negative for dysuria.  Musculoskeletal: Negative for significant myalgia, back and joint pain.   Skin: Negative for rash.   Neuro: Negative for dizziness, weakness and headaches.   Endo/Heme/Allergies: Does not bruise/bleed easily.   Psychiatric/Behavioral: Negative for depression.    PHYSICAL EXAM   Blood Pressure (Abnormal) 98/68 (BP Location: Right arm, Patient Position: Sitting, BP Cuff Size: Adult)   Pulse 91   Temperature 36.7 °C (98.1 °F) (Temporal)   Height 1.676 m (5' 6\")   Weight 91.2 kg (201 lb)   Oxygen Saturation 99%  Body mass index is 32.44 kg/m².  General:  NAD, well appearing  HEENT:   NC/AT, PERRLA, EOMI.  Cardiovascular: unlabored breathing, no peripheral cyanosis or swelling.  Lungs:   no respiratory distress.  Abdomen: non- distended.  Extremities:  No LE swelling.  Skin:  Warm, dry.  No erythema. No rash.   Neurologic: Alert & oriented x 3. CN " II-XII grossly intact. No focal deficits.  Psychiatric:  Affect normal, mood normal, judgment normal.    Labs     Labs are reviewed and discussed with a patient  Lab Results   Component Value Date/Time    CHOLSTRLTOT 161 09/18/2020 11:01 AM    LDL 84 09/18/2020 11:01 AM    HDL 54 09/18/2020 11:01 AM    TRIGLYCERIDE 115 09/18/2020 11:01 AM       Lab Results   Component Value Date/Time    SODIUM 135 09/18/2020 11:01 AM    POTASSIUM 4.2 09/18/2020 11:01 AM    CHLORIDE 102 09/18/2020 11:01 AM    CO2 21 09/18/2020 11:01 AM    GLUCOSE 111 (H) 09/18/2020 11:01 AM    BUN 10 09/18/2020 11:01 AM    CREATININE 0.74 09/18/2020 11:01 AM     Lab Results   Component Value Date/Time    ALKPHOSPHAT 85 09/18/2020 11:01 AM    ASTSGOT 28 09/18/2020 11:01 AM    ALTSGPT 24 09/18/2020 11:01 AM    TBILIRUBIN 0.5 09/18/2020 11:01 AM      Lab Results   Component Value Date/Time    HBA1C 5.9 (H) 09/18/2020 11:01 AM     No results found for: TSH  No results found for: FREET4    Lab Results   Component Value Date/Time    WBC 7.2 09/18/2020 11:01 AM    RBC 4.70 09/18/2020 11:01 AM    HEMOGLOBIN 12.4 09/18/2020 11:01 AM    HEMATOCRIT 39.0 09/18/2020 11:01 AM    MCV 83.0 09/18/2020 11:01 AM    MCH 26.4 (L) 09/18/2020 11:01 AM    MCHC 31.8 (L) 09/18/2020 11:01 AM    MPV 10.5 09/18/2020 11:01 AM    NEUTSPOLYS 48.90 09/18/2020 11:01 AM    LYMPHOCYTES 38.70 09/18/2020 11:01 AM    MONOCYTES 9.00 09/18/2020 11:01 AM    EOSINOPHILS 2.80 09/18/2020 11:01 AM    BASOPHILS 0.30 09/18/2020 11:01 AM      Imaging     None    Assessment and Plan     Izzy Mills is a 40 y.o. female    1. IFG (impaired fasting glucose)  Discussed about risk to develop DM.   Advised low carb diet, exercise, watch for WT.   - Comp Metabolic Panel; Future  - HEMOGLOBIN A1C; Future    2. Hypovitaminosis D  Pending labs, continue current supplement  - VITAMIN D,25 HYDROXY; Future    3. Cyst of left ovary  Symptoms improved, continue GYN follow-up    4. Proctalgia  No current  significant pain    5. Obesity (BMI 30.0-34.9)  - OBESITY COUNSELING (No Charge): Patient identified as having weight management issue.  Appropriate orders and counseling given.    6. Encounter for screening mammogram for malignant neoplasm of breast  Due in 10/2021  - MA-SCREENING MAMMO BILAT W/TOMOSYNTHESIS W/CAD; Future    Counseling:   - Smoking:  Nonsmoker    Followup: In 1 year and as needed    All questions are answered.    Please note that this dictation was created using voice recognition software, and that there might be errors of autumn and possibly content.

## 2021-03-27 ENCOUNTER — HOSPITAL ENCOUNTER (OUTPATIENT)
Dept: LAB | Facility: MEDICAL CENTER | Age: 41
End: 2021-03-27
Attending: INTERNAL MEDICINE
Payer: COMMERCIAL

## 2021-03-27 DIAGNOSIS — R73.01 IMPAIRED FASTING GLUCOSE: ICD-10-CM

## 2021-03-27 DIAGNOSIS — E55.9 HYPOVITAMINOSIS D: ICD-10-CM

## 2021-03-27 LAB
25(OH)D3 SERPL-MCNC: 48 NG/ML (ref 30–100)
ALBUMIN SERPL BCP-MCNC: 3.8 G/DL (ref 3.2–4.9)
ALBUMIN/GLOB SERPL: 1.2 G/DL
ALP SERPL-CCNC: 94 U/L (ref 30–99)
ALT SERPL-CCNC: 13 U/L (ref 2–50)
ANION GAP SERPL CALC-SCNC: 10 MMOL/L (ref 7–16)
AST SERPL-CCNC: 16 U/L (ref 12–45)
BILIRUB SERPL-MCNC: 0.3 MG/DL (ref 0.1–1.5)
BUN SERPL-MCNC: 10 MG/DL (ref 8–22)
CALCIUM SERPL-MCNC: 9.4 MG/DL (ref 8.5–10.5)
CHLORIDE SERPL-SCNC: 106 MMOL/L (ref 96–112)
CO2 SERPL-SCNC: 24 MMOL/L (ref 20–33)
CREAT SERPL-MCNC: 0.8 MG/DL (ref 0.5–1.4)
EST. AVERAGE GLUCOSE BLD GHB EST-MCNC: 154 MG/DL
FASTING STATUS PATIENT QL REPORTED: NORMAL
GLOBULIN SER CALC-MCNC: 3.3 G/DL (ref 1.9–3.5)
GLUCOSE SERPL-MCNC: 108 MG/DL (ref 65–99)
HBA1C MFR BLD: 7 % (ref 4–5.6)
POTASSIUM SERPL-SCNC: 4.2 MMOL/L (ref 3.6–5.5)
PROT SERPL-MCNC: 7.1 G/DL (ref 6–8.2)
SODIUM SERPL-SCNC: 140 MMOL/L (ref 135–145)

## 2021-03-27 PROCEDURE — 36415 COLL VENOUS BLD VENIPUNCTURE: CPT

## 2021-03-27 PROCEDURE — 82306 VITAMIN D 25 HYDROXY: CPT

## 2021-03-27 PROCEDURE — 80053 COMPREHEN METABOLIC PANEL: CPT

## 2021-03-27 PROCEDURE — 83036 HEMOGLOBIN GLYCOSYLATED A1C: CPT

## 2021-04-20 ENCOUNTER — TELEMEDICINE (OUTPATIENT)
Dept: MEDICAL GROUP | Age: 41
End: 2021-04-20
Payer: COMMERCIAL

## 2021-04-20 VITALS — WEIGHT: 201 LBS | HEIGHT: 66 IN | BODY MASS INDEX: 32.3 KG/M2 | TEMPERATURE: 97.3 F

## 2021-04-20 DIAGNOSIS — N83.209 CYST OF OVARY, UNSPECIFIED LATERALITY: ICD-10-CM

## 2021-04-20 DIAGNOSIS — E55.9 HYPOVITAMINOSIS D: ICD-10-CM

## 2021-04-20 DIAGNOSIS — R73.01 IMPAIRED FASTING GLUCOSE: ICD-10-CM

## 2021-04-20 DIAGNOSIS — Z00.00 HEALTH CARE MAINTENANCE: ICD-10-CM

## 2021-04-20 DIAGNOSIS — E66.9 OBESITY (BMI 30.0-34.9): ICD-10-CM

## 2021-04-20 PROCEDURE — 99214 OFFICE O/P EST MOD 30 MIN: CPT | Mod: 95,CR | Performed by: INTERNAL MEDICINE

## 2021-04-20 RX ORDER — METFORMIN HYDROCHLORIDE 500 MG/1
500 TABLET, EXTENDED RELEASE ORAL DAILY
Qty: 180 TABLET | Refills: 1 | Status: SHIPPED | OUTPATIENT
Start: 2021-04-20 | End: 2022-01-28

## 2021-04-20 ASSESSMENT — FIBROSIS 4 INDEX: FIB4 SCORE: 0.5

## 2021-04-20 NOTE — PROGRESS NOTES
Telemedicine Visit: Established Patient     This Remote Face to Face encounter was conducted via Zoom. Given the importance of social distancing and other strategies recommended to reduce the risk of COVID-19 transmission, I am providing medical care to this patient via audio/video visit in place of an in person visit at the request of the patient. Verbal consent to telehealth, risks, benefits, and consequences were discussed. Patient retains the right to withdraw at any time. All existing confidentiality protections apply. The patient has access to all transmitted medical information. No dissemination of any patient images or information to other entities without further written consent.    CHIEF COMPLAINT     Chief Complaint   Patient presents with   • Lab Results   IFG, labs    HPI  Izzy Mills is a 40 y.o. female who presents today for the following     IFG  The patient had elevated FBG, and A1c.  No polydipsia, polyphagia, polyuria.  No abdominal pain, weight loss, fatigue.  Diet: Regular  Exercise: Insufficient  BMI: 32  FH of DM: parents     Hypovitaminosis D  The patient had low vitamin D level, treated with prescription dose.  Vurrent supplement: high dose.     H/o left ovarian cyst, (oophorectomy)  The patient had left ovarian cyst, status post oophorectomy on the left side in 2016.  No abdominal/pelvic pain.     OBESITY, Body mass index is 32, was 34  Onset: since 2016  Diet:  vegetarian  Exercise: insufficient  No temperature intolerance. No change in hair/skin quality, BMs.   No HTN, buffalo hump, purple striae, flushing.  FH of obesity: mother  She did not tolerate phentermine given in 9/2020.    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 12 capsule 1   • DEXTROMETHORPHAN POLISTIREX ER PO Take 5 mL/mL by mouth.     • Multiple Vitamins-Calcium (ONE-A-DAY WOMENS FORMULA PO) Take 1 Tab by mouth every day.       No current facility-administered medications for this visit.     ALLERGIES  Allergies: Patient has no known allergies.  PAST MEDICAL HISTORY  Past Medical History:   Diagnosis Date   • Pain 09-    pelvic pain, 2/10   • Gynecological disorder     ovarian cyst   • Jaundice     at birth     SURGICAL HISTORY  She  has a past surgical history that includes dilation and curettage (); pelviscopy (N/A, 9/29/2016); ovarian cystectomy (Left, 9/29/2016); and oophorectomy (Left, 9/29/2016).  SOCIAL HISTORY  Social History     Tobacco Use   • Smoking status: Never Smoker   • Smokeless tobacco: Never Used   Substance Use Topics   • Alcohol use: Yes     Alcohol/week: 0.0 oz     Comment: 1-2 per 6 months   • Drug use: No     Social History     Social History Narrative   • Not on file     FAMILY HISTORY  Family History   Problem Relation Age of Onset   • Diabetes Mother    • Asthma Mother    • Diabetes Father    • Prostate enlargement (BPH) Father    • Asthma Sister      Family Status   Relation Name Status   • Mo  Alive   • Fa  Alive   • Sis  Alive     ROS   Constitutional: Negative for fever, chills, fatigue.  HENT: Negative for congestion, sore throat.  Eyes: Negative for vision problems.   Respiratory: Negative for cough, shortness of breath.  Cardiovascular: Negative for chest pain, palpitations.   Gastrointestinal: Negative for heartburn, nausea, abdominal pain.   Genitourinary: Negative for dysuria.  Musculoskeletal: Negative for significant myalgia, back and joint pain.   Skin: Negative for rash.   Neuro: Negative for dizziness,  "weakness and headaches.   Endo/Heme/Allergies: Does not bruise/bleed easily.   Psychiatric/Behavioral: Negative for depression.    Objective   Vitals obtained by patient:  Temperature 36.3 °C (97.3 °F)   Height 1.676 m (5' 6\")   Weight 91.2 kg (201 lb)   Body Mass Index 32.44 kg/m²   Physical Exam:  Constitutional: Alert, no distress, well-groomed.  Skin: No rash in visible areas.  Eye: Round. Conjunctiva clear, lids normal.  ENMT: Lips pink without lesions, good dentition. Phonation normal.  Neck: No visible masses or thyromegaly. Moves freely without pain.  CV: no peripheral cyanosis, tachycardia.  Respiratory: Unlabored respiratory effort, no cough or audible wheezing.  Psych: Alert and oriented x3, normal affect and mood.     Labs     Labs are reviewed and discussed with a patient  Lab Results   Component Value Date/Time    CHOLSTRLTOT 161 09/18/2020 11:01 AM    LDL 84 09/18/2020 11:01 AM    HDL 54 09/18/2020 11:01 AM    TRIGLYCERIDE 115 09/18/2020 11:01 AM       Lab Results   Component Value Date/Time    SODIUM 140 03/27/2021 11:12 AM    POTASSIUM 4.2 03/27/2021 11:12 AM    CHLORIDE 106 03/27/2021 11:12 AM    CO2 24 03/27/2021 11:12 AM    GLUCOSE 108 (H) 03/27/2021 11:12 AM    BUN 10 03/27/2021 11:12 AM    CREATININE 0.80 03/27/2021 11:12 AM     Lab Results   Component Value Date/Time    ALKPHOSPHAT 94 03/27/2021 11:12 AM    ASTSGOT 16 03/27/2021 11:12 AM    ALTSGPT 13 03/27/2021 11:12 AM    TBILIRUBIN 0.3 03/27/2021 11:12 AM      Lab Results   Component Value Date/Time    HBA1C 7.0 (H) 03/27/2021 11:12 AM    HBA1C 5.9 (H) 09/18/2020 11:01 AM     No results found for: TSH  No results found for: FREET4    Lab Results   Component Value Date/Time    WBC 7.2 09/18/2020 11:01 AM    RBC 4.70 09/18/2020 11:01 AM    HEMOGLOBIN 12.4 09/18/2020 11:01 AM    HEMATOCRIT 39.0 09/18/2020 11:01 AM    MCV 83.0 09/18/2020 11:01 AM    MCH 26.4 (L) 09/18/2020 11:01 AM    MCHC 31.8 (L) 09/18/2020 11:01 AM    MPV 10.5 09/18/2020 " 11:01 AM    NEUTSPOLYS 48.90 09/18/2020 11:01 AM    LYMPHOCYTES 38.70 09/18/2020 11:01 AM    MONOCYTES 9.00 09/18/2020 11:01 AM    EOSINOPHILS 2.80 09/18/2020 11:01 AM    BASOPHILS 0.30 09/18/2020 11:01 AM      Imaging      None    Assessment and Plan     Izzy Mills is a 40 y.o. female    1. IFG (impaired fasting glucose)  Advised low-calorie diet maximum 1500 kcal/day, daily exercise, further weight loss   Start Metformin  - Comp Metabolic Panel; Future  - HEMOGLOBIN A1C; Future  - MICROALBUMIN CREAT RATIO URINE; Future  - metFORMIN ER (GLUCOPHAGE XR) 500 MG TABLET SR 24 HR; Take 1 tablet by mouth every day.  Dispense: 180 tablet; Refill: 1  - REFERRAL TO [facility] (HIP)    2. Hypovitaminosis D  - Controlled, continue with current treatment.    3. Cyst of ovary, unspecified laterality  No abdominal pain    4. Obesity (BMI 30.0-34.9)  - REFERRAL TO [facility] ([facility])    5. Health care maintenance  Due covid vaccine    Follow-up: in 4 months and prn

## 2021-06-17 ENCOUNTER — OFFICE VISIT (OUTPATIENT)
Dept: HEALTH INFORMATION MANAGEMENT | Facility: MEDICAL CENTER | Age: 41
End: 2021-06-17
Payer: COMMERCIAL

## 2021-06-17 VITALS — BODY MASS INDEX: 32.03 KG/M2 | HEIGHT: 66 IN | WEIGHT: 199.3 LBS

## 2021-06-17 DIAGNOSIS — E66.9 OBESITY (BMI 30.0-34.9): ICD-10-CM

## 2021-06-17 DIAGNOSIS — R73.01 IMPAIRED FASTING GLUCOSE: ICD-10-CM

## 2021-06-17 PROCEDURE — 97802 MEDICAL NUTRITION INDIV IN: CPT | Performed by: DIETITIAN, REGISTERED

## 2021-06-17 ASSESSMENT — FIBROSIS 4 INDEX: FIB4 SCORE: 0.5

## 2021-06-17 NOTE — PROGRESS NOTES
"6/17/2021    Ana Ramsey M.D.  40 y.o.   Time in/out: 2:36-3:35 PM    Anthropometrics/Objective  Vitals:    06/17/21 1650   Weight: 90.4 kg (199 lb 4.8 oz)   Height: 1.676 m (5' 6\")       Body mass index is 32.17 kg/m².    Stated Goal Weight: 140 lb  Estimated Caloric needs 1586 Kcal ([place])  See comprehensive patient history form for further information     Subjective:  - pt here today with concerns of recent HgbA1C of 7.0 and has a goal to lose weight  - currently taking Metformin  - previous nutrition education from PCP  - lost weight in the past by doing HIIT training  - currently walking 1.5 miles, 3x/ week  - does not eat meat, fish, eggs, yogurt  - eats 1 meal per day ~4:00 PM    Nutrition Diagnosis (PES Statement)  · Altered nutrition related lab values related to endocrine dysfunction as evidenced by HgbA1c of 7.0.    Client history:  Condition(s) associated with a diagnosis or treatment (specify) obesity, IFG    Biochemical data, medical test and procedures  Lab Results   Component Value Date/Time    HBA1C 7.0 (H) 03/27/2021 11:12 AM   @  No results found for: POCGLUCOSE  Lab Results   Component Value Date/Time    CHOLSTRLTOT 161 09/18/2020 11:01 AM    LDL 84 09/18/2020 11:01 AM    HDL 54 09/18/2020 11:01 AM    TRIGLYCERIDE 115 09/18/2020 11:01 AM         Nutrition Intervention  Nutrition Prescription  Carb choices/grams 30-45g per meal, 15-20g per snack    Meal and Snack  Recommend a general/healthful diet    Comprehensive Nutrition education Instruction or training leading to in-depth nutrition related knowledge about:  Combine carb, protein and fat at each meal   - Nutrition Basics   - My Plate   - Vegetarian Proteins   - My Fitness Pal How-to Guide    Monitoring & Evaluation Plan    Behavioral-Environmental:  Behavior: consider tracking intake on My Fitness Pal    Food / Nutrient Intake:  Fluid/Beverage intake: choose unsweetened beverages, at least 64 oz water daily  Food intake: follow " the plate method for meal balance and portion control    Physical Signs / Symptoms:  HbA1c profiles within ADA guidelines/endocrinologist recommendations    Assessment Notes: Today we reviewed nutrition basics for a better understanding of carbohydrates, proteins and fats. Reviewed the plate method for meal balance and portion control. Recommended for pt to choose 30-45g of carbs or a 'fist size' budget at each meal. Suggested for pt to include 3 meals per day. Recommended for pt to include a protein with each meal. Reviewed the Vegetarian Protein handout. Showed pt the DMBTP book with some meatless meal ideas that follow the plate method. Provided pt with the MFP How-to Guide and recommended for her to start tracking her intake. Next visit suggest to review calorie and macronutrient goals.      Goals:  1. Drink at least 64 oz of water daily  2. Eat 3 meals per day    F/U: PRN

## 2022-01-28 DIAGNOSIS — R73.01 IMPAIRED FASTING GLUCOSE: ICD-10-CM

## 2022-01-28 RX ORDER — METFORMIN HYDROCHLORIDE 500 MG/1
500 TABLET, EXTENDED RELEASE ORAL DAILY
Qty: 60 TABLET | Refills: 0 | Status: SHIPPED | OUTPATIENT
Start: 2022-01-28 | End: 2022-02-11

## 2022-02-11 DIAGNOSIS — R73.01 IMPAIRED FASTING GLUCOSE: ICD-10-CM

## 2022-02-11 RX ORDER — METFORMIN HYDROCHLORIDE 500 MG/1
500 TABLET, EXTENDED RELEASE ORAL DAILY
Qty: 30 TABLET | Refills: 0 | Status: SHIPPED | OUTPATIENT
Start: 2022-02-11 | End: 2022-11-11

## 2022-11-10 ENCOUNTER — APPOINTMENT (OUTPATIENT)
Dept: MEDICAL GROUP | Facility: MEDICAL CENTER | Age: 42
End: 2022-11-10
Payer: COMMERCIAL

## 2022-11-10 ENCOUNTER — HOSPITAL ENCOUNTER (EMERGENCY)
Facility: MEDICAL CENTER | Age: 42
End: 2022-11-11
Attending: EMERGENCY MEDICINE
Payer: COMMERCIAL

## 2022-11-10 DIAGNOSIS — R42 VERTIGO: ICD-10-CM

## 2022-11-10 LAB
ANION GAP SERPL CALC-SCNC: 14 MMOL/L (ref 7–16)
BASOPHILS # BLD AUTO: 0.3 % (ref 0–1.8)
BASOPHILS # BLD: 0.03 K/UL (ref 0–0.12)
BUN SERPL-MCNC: 11 MG/DL (ref 8–22)
CALCIUM SERPL-MCNC: 9.8 MG/DL (ref 8.4–10.2)
CHLORIDE SERPL-SCNC: 100 MMOL/L (ref 96–112)
CO2 SERPL-SCNC: 22 MMOL/L (ref 20–33)
CREAT SERPL-MCNC: 0.7 MG/DL (ref 0.5–1.4)
EOSINOPHIL # BLD AUTO: 0.09 K/UL (ref 0–0.51)
EOSINOPHIL NFR BLD: 0.8 % (ref 0–6.9)
ERYTHROCYTE [DISTWIDTH] IN BLOOD BY AUTOMATED COUNT: 42.6 FL (ref 35.9–50)
GFR SERPLBLD CREATININE-BSD FMLA CKD-EPI: 110 ML/MIN/1.73 M 2
GLUCOSE SERPL-MCNC: 150 MG/DL (ref 65–99)
HCG SERPL QL: NEGATIVE
HCT VFR BLD AUTO: 47.2 % (ref 37–47)
HGB BLD-MCNC: 15.4 G/DL (ref 12–16)
IMM GRANULOCYTES # BLD AUTO: 0.04 K/UL (ref 0–0.11)
IMM GRANULOCYTES NFR BLD AUTO: 0.4 % (ref 0–0.9)
LYMPHOCYTES # BLD AUTO: 2.56 K/UL (ref 1–4.8)
LYMPHOCYTES NFR BLD: 23.7 % (ref 22–41)
MCH RBC QN AUTO: 26.1 PG (ref 27–33)
MCHC RBC AUTO-ENTMCNC: 32.6 G/DL (ref 33.6–35)
MCV RBC AUTO: 80 FL (ref 81.4–97.8)
MONOCYTES # BLD AUTO: 0.47 K/UL (ref 0–0.85)
MONOCYTES NFR BLD AUTO: 4.4 % (ref 0–13.4)
NEUTROPHILS # BLD AUTO: 7.61 K/UL (ref 2–7.15)
NEUTROPHILS NFR BLD: 70.4 % (ref 44–72)
NRBC # BLD AUTO: 0 K/UL
NRBC BLD-RTO: 0 /100 WBC
PLATELET # BLD AUTO: 484 K/UL (ref 164–446)
PMV BLD AUTO: 8.8 FL (ref 9–12.9)
POTASSIUM SERPL-SCNC: 4.4 MMOL/L (ref 3.6–5.5)
RBC # BLD AUTO: 5.9 M/UL (ref 4.2–5.4)
SODIUM SERPL-SCNC: 136 MMOL/L (ref 135–145)
WBC # BLD AUTO: 10.8 K/UL (ref 4.8–10.8)

## 2022-11-10 PROCEDURE — 80048 BASIC METABOLIC PNL TOTAL CA: CPT

## 2022-11-10 PROCEDURE — 85025 COMPLETE CBC W/AUTO DIFF WBC: CPT

## 2022-11-10 PROCEDURE — 36415 COLL VENOUS BLD VENIPUNCTURE: CPT

## 2022-11-10 PROCEDURE — 700105 HCHG RX REV CODE 258: Performed by: EMERGENCY MEDICINE

## 2022-11-10 PROCEDURE — 96374 THER/PROPH/DIAG INJ IV PUSH: CPT

## 2022-11-10 PROCEDURE — 84703 CHORIONIC GONADOTROPIN ASSAY: CPT

## 2022-11-10 PROCEDURE — 700111 HCHG RX REV CODE 636 W/ 250 OVERRIDE (IP): Performed by: EMERGENCY MEDICINE

## 2022-11-10 PROCEDURE — 700102 HCHG RX REV CODE 250 W/ 637 OVERRIDE(OP): Performed by: EMERGENCY MEDICINE

## 2022-11-10 PROCEDURE — 83036 HEMOGLOBIN GLYCOSYLATED A1C: CPT

## 2022-11-10 PROCEDURE — 99284 EMERGENCY DEPT VISIT MOD MDM: CPT

## 2022-11-10 PROCEDURE — A9270 NON-COVERED ITEM OR SERVICE: HCPCS | Performed by: EMERGENCY MEDICINE

## 2022-11-10 RX ORDER — ONDANSETRON 4 MG/1
4 TABLET, ORALLY DISINTEGRATING ORAL ONCE
Status: COMPLETED | OUTPATIENT
Start: 2022-11-10 | End: 2022-11-10

## 2022-11-10 RX ORDER — IBUPROFEN 200 MG
400 TABLET ORAL ONCE
Status: COMPLETED | OUTPATIENT
Start: 2022-11-10 | End: 2022-11-10

## 2022-11-10 RX ORDER — LORAZEPAM 0.5 MG/1
0.5 TABLET ORAL ONCE
Status: COMPLETED | OUTPATIENT
Start: 2022-11-10 | End: 2022-11-10

## 2022-11-10 RX ORDER — MECLIZINE HYDROCHLORIDE 25 MG/1
25 TABLET ORAL ONCE
Status: COMPLETED | OUTPATIENT
Start: 2022-11-10 | End: 2022-11-10

## 2022-11-10 RX ORDER — LORAZEPAM 0.5 MG/1
0.5 TABLET ORAL EVERY 8 HOURS PRN
Qty: 15 TABLET | Refills: 0 | Status: SHIPPED | OUTPATIENT
Start: 2022-11-10 | End: 2022-11-15

## 2022-11-10 RX ORDER — SODIUM CHLORIDE 9 MG/ML
1000 INJECTION, SOLUTION INTRAVENOUS ONCE
Status: COMPLETED | OUTPATIENT
Start: 2022-11-10 | End: 2022-11-10

## 2022-11-10 RX ORDER — ONDANSETRON 2 MG/ML
4 INJECTION INTRAMUSCULAR; INTRAVENOUS ONCE
Status: COMPLETED | OUTPATIENT
Start: 2022-11-11 | End: 2022-11-10

## 2022-11-10 RX ORDER — MECLIZINE HYDROCHLORIDE 25 MG/1
25 TABLET ORAL EVERY 6 HOURS PRN
Qty: 20 TABLET | Refills: 0 | Status: SHIPPED | OUTPATIENT
Start: 2022-11-10 | End: 2023-05-22 | Stop reason: SDUPTHER

## 2022-11-10 RX ADMIN — SODIUM CHLORIDE 1000 ML: 9 INJECTION, SOLUTION INTRAVENOUS at 21:15

## 2022-11-10 RX ADMIN — LORAZEPAM 0.5 MG: 0.5 TABLET ORAL at 22:10

## 2022-11-10 RX ADMIN — MECLIZINE HYDROCHLORIDE 25 MG: 25 TABLET ORAL at 21:12

## 2022-11-10 RX ADMIN — ONDANSETRON 4 MG: 2 INJECTION INTRAMUSCULAR; INTRAVENOUS at 23:47

## 2022-11-10 RX ADMIN — ONDANSETRON 4 MG: 4 TABLET, ORALLY DISINTEGRATING ORAL at 20:15

## 2022-11-10 RX ADMIN — IBUPROFEN 400 MG: 200 TABLET, FILM COATED ORAL at 23:45

## 2022-11-11 ENCOUNTER — APPOINTMENT (OUTPATIENT)
Dept: RADIOLOGY | Facility: MEDICAL CENTER | Age: 42
End: 2022-11-11
Attending: EMERGENCY MEDICINE
Payer: COMMERCIAL

## 2022-11-11 VITALS
DIASTOLIC BLOOD PRESSURE: 61 MMHG | OXYGEN SATURATION: 95 % | RESPIRATION RATE: 18 BRPM | HEART RATE: 80 BPM | SYSTOLIC BLOOD PRESSURE: 118 MMHG | TEMPERATURE: 96.6 F | HEIGHT: 65 IN | WEIGHT: 199.3 LBS | BODY MASS INDEX: 33.2 KG/M2

## 2022-11-11 LAB
APPEARANCE UR: CLEAR
BACTERIA #/AREA URNS HPF: ABNORMAL /HPF
BILIRUB UR QL STRIP.AUTO: NEGATIVE
COLOR UR: YELLOW
EPI CELLS #/AREA URNS HPF: ABNORMAL /HPF
EST. AVERAGE GLUCOSE BLD GHB EST-MCNC: 128 MG/DL
GLUCOSE UR STRIP.AUTO-MCNC: NEGATIVE MG/DL
HBA1C MFR BLD: 6.1 % (ref 4–5.6)
KETONES UR STRIP.AUTO-MCNC: 15 MG/DL
LEUKOCYTE ESTERASE UR QL STRIP.AUTO: NEGATIVE
MICRO URNS: ABNORMAL
MUCOUS THREADS #/AREA URNS HPF: ABNORMAL /HPF
NITRITE UR QL STRIP.AUTO: NEGATIVE
PH UR STRIP.AUTO: 7 [PH] (ref 5–8)
PROT UR QL STRIP: NEGATIVE MG/DL
RBC # URNS HPF: ABNORMAL /HPF
RBC UR QL AUTO: ABNORMAL
SP GR UR STRIP.AUTO: 1.01
UNIDENT CRYS URNS QL MICRO: ABNORMAL /HPF
WBC #/AREA URNS HPF: ABNORMAL /HPF
YEAST #/AREA URNS HPF: ABNORMAL /HPF
YEAST BUDDING URNS QL: PRESENT /HPF

## 2022-11-11 PROCEDURE — 81001 URINALYSIS AUTO W/SCOPE: CPT

## 2022-11-11 PROCEDURE — 70551 MRI BRAIN STEM W/O DYE: CPT

## 2022-11-11 PROCEDURE — 700105 HCHG RX REV CODE 258: Performed by: EMERGENCY MEDICINE

## 2022-11-11 RX ORDER — SODIUM CHLORIDE 9 MG/ML
1000 INJECTION, SOLUTION INTRAVENOUS ONCE
Status: COMPLETED | OUTPATIENT
Start: 2022-11-11 | End: 2022-11-11

## 2022-11-11 RX ORDER — ACETAMINOPHEN 500 MG
500-1000 TABLET ORAL EVERY 6 HOURS PRN
COMMUNITY

## 2022-11-11 RX ORDER — IBUPROFEN 200 MG
600 TABLET ORAL EVERY 6 HOURS PRN
COMMUNITY

## 2022-11-11 RX ADMIN — SODIUM CHLORIDE 1000 ML: 9 INJECTION, SOLUTION INTRAVENOUS at 01:41

## 2022-11-11 NOTE — ED TRIAGE NOTES
"Patient present to the ER via [facility] with the following complaints:    Chief Complaint   Patient presents with    N/V     Received 12.5 Promethazine IM from [facility] in route. Filled a 1 liter bag full of vomit in route. Color yellow clear.     Vertigo     Patient began feeling like the room was spinning at 1200 today. Layed on the bathroom floor since. Hx of vertigo per [facility]       BP (!) 128/93   Pulse 80   Temp 37.2 °C (98.9 °F) (Temporal)   Resp 18   Ht 1.651 m (5' 5\")   Wt 90.4 kg (199 lb 4.8 oz)   SpO2 99%   BMI 33.17 kg/m²       "

## 2022-11-11 NOTE — ED NOTES
Discharge instructions given and discussed. RX for Lorazepam and Meclizine given and patient educated to come back to ER for new or worsening symptoms. Instructed to follow up with PCP. Patient verbalized understanding, IV's removed and patient discharged in stable condition.

## 2022-11-11 NOTE — ED PROVIDER NOTES
"ED Provider Note    Scribed for Derrick Villalba M.D. by Tanvi Montero. 11/10/2022  8:40 PM    Primary care provider: Ana Ramsey M.D.  Means of arrival: Walk in  History obtained from: Patient   History limited by: None     CHIEF COMPLAINT  Chief Complaint   Patient presents with    N/V     Received 12.5 Promethazine IM from [facility] in route. Filled a 1 liter bag full of vomit in route. Color yellow clear.     Vertigo     Patient began feeling like the room was spinning at 1200 today. Layed on the bathroom floor since. Hx of vertigo per [facility]       HPI  Izzy Mills is a 42 y.o. female who presents for evaluation of room-spinning dizziness onset 12pm today. Patient states that she has a history of vertigo, and her last episode was about a year ago. She states that her symptoms now feel similar, but they have never lasted this long before. She notes that the dizziness will last for about 15-20 minutes. Patient reports that she got an MRI done in 2019, which came back normal. She also reports that her head feels like \"jello\" and that talking takes an additional effort. She admits to associated symptoms of diplopia, nausea, vomiting (4 episodes), headache, weakness, and a lingering cough, but denies numbness, facial droop, flu-like symptoms, urinary problems, abdominal pain, rashes, or swelling. She adds that moving exacerbates her symptoms, and closing her eyes and laying down alleviates her symptoms.  She states that she had COVID last week, but is now improved.     REVIEW OF SYSTEMS  Pertinent positives include: room-spinning dizziness, diplopia, nausea, vomiting (4 episodes), headache, weakness, and a lingering cough.  Pertinent negatives include: numbness, facial droop, flu-like symptoms, urinary problems, abdominal pain, rashes, or swelling.    PAST MEDICAL HISTORY  Past Medical History:   Diagnosis Date    Gynecological disorder     ovarian cyst    Jaundice     at birth    Pain 09-    pelvic " "pain, 2/10       SOCIAL HISTORY  Social History     Tobacco Use    Smoking status: Never    Smokeless tobacco: Never   Substance Use Topics    Alcohol use: Yes     Alcohol/week: 0.0 oz     Comment: 1-2 per 6 months    Drug use: No       CURRENT MEDICATIONS  Home Medications       Reviewed by Alvin Corado R.N. (Registered Nurse) on 11/10/22 at 1956  Med List Status: Not Addressed     Medication Last Dose Status   Cholecalciferol (VITAMIN D3) 1.25 MG (60372 UT) Cap  Active   DEXTROMETHORPHAN POLISTIREX ER PO  Active   metFORMIN ER (GLUCOPHAGE XR) 500 MG TABLET SR 24 HR  Active   Multiple Vitamins-Calcium (ONE-A-DAY WOMENS FORMULA PO)  Active                    ALLERGIES  No Known Allergies    PHYSICAL EXAM  VITAL SIGNS: BP (!) 132/93   Pulse 73   Temp 35.9 °C (96.6 °F) (Temporal)   Resp 18   Ht 1.651 m (5' 5\")   Wt 90.4 kg (199 lb 4.8 oz)   SpO2 98%   BMI 33.17 kg/m²    Reviewed and high normal blood pressure, afebrile    Constitutional :  Well developed, Well nourished.   HNT: atraumatic, wearing a mask.  Face symmetric  Ears: external ears normal.  Eyes: pupils reactive without eye discharge nor conjunctival hyperemia.  No nystagmus or diplopia  Neck: Normal range of motion, No tenderness, Supple, No stridor.   Lymphatic: No cervical adenopathy    Cardiovascular: Regular rhythm, No murmurs, No rubs, No gallops.  No cyanosis.   Respiratory: No rales, rhonchi, wheeze, cough  Abdomen:  Soft, nontender  Skin: Warm, dry, no erythema, no rash.   Neuro: Cranial nerves II-XII are intact, Grasp, biceps, extensor hallucis longus, ankle plantar flexion are 5/5 and symmetric, Finger nose finger and fine motor normal. Sensation is intact to light touch in all 4 limbs.  No focal deficits noted. No nystagmus.   Musculoskeletal: no limb deformities.    DIFFERENTIALS:  Peripheral vertigo, Central vertigo, Positional vertigo, or Dehydration, acoustic neuroma, COVID vestibular neuronitis.      LABORATORY:  Results for " orders placed or performed during the hospital encounter of 11/10/22   CBC WITH DIFFERENTIAL   Result Value Ref Range    WBC 10.8 4.8 - 10.8 K/uL    RBC 5.90 (H) 4.20 - 5.40 M/uL    Hemoglobin 15.4 12.0 - 16.0 g/dL    Hematocrit 47.2 (H) 37.0 - 47.0 %    MCV 80.0 (L) 81.4 - 97.8 fL    MCH 26.1 (L) 27.0 - 33.0 pg    MCHC 32.6 (L) 33.6 - 35.0 g/dL    RDW 42.6 35.9 - 50.0 fL    Platelet Count 484 (H) 164 - 446 K/uL    MPV 8.8 (L) 9.0 - 12.9 fL    Neutrophils-Polys 70.40 44.00 - 72.00 %    Lymphocytes 23.70 22.00 - 41.00 %    Monocytes 4.40 0.00 - 13.40 %    Eosinophils 0.80 0.00 - 6.90 %    Basophils 0.30 0.00 - 1.80 %    Immature Granulocytes 0.40 0.00 - 0.90 %    Nucleated RBC 0.00 /100 WBC    Neutrophils (Absolute) 7.61 (H) 2.00 - 7.15 K/uL    Lymphs (Absolute) 2.56 1.00 - 4.80 K/uL    Monos (Absolute) 0.47 0.00 - 0.85 K/uL    Eos (Absolute) 0.09 0.00 - 0.51 K/uL    Baso (Absolute) 0.03 0.00 - 0.12 K/uL    Immature Granulocytes (abs) 0.04 0.00 - 0.11 K/uL    NRBC (Absolute) 0.00 K/uL     Lab results reviewed by me.     INTERVENTIONS:  Medications   ondansetron (ZOFRAN ODT) dispertab 4 mg (4 mg Oral Given 11/10/22 2015)   NS infusion 1,000 mL (0 mL Intravenous Stopped 11/10/22 2341)   meclizine (ANTIVERT) tablet 25 mg (25 mg Oral Given 11/10/22 2112)   LORazepam (ATIVAN) tablet 0.5 mg (0.5 mg Oral Given 11/10/22 2210)   ibuprofen (MOTRIN) tablet 400 mg (400 mg Oral Given 11/10/22 2345)   ondansetron (ZOFRAN) syringe/vial injection 4 mg (4 mg Intravenous Given 11/10/22 9974)     Response: Improvement in vertigo and nausea which was transient.  She slowly developed a headache which was treated with ibuprofen.    ED COURSE:    8:40 PM - Patient seen and examined at bedside. Patient will be treated with Zofran, Antivert, and NS bolus infusion for her symptoms. Ordered CBC w/diff, BMP, HCG qual serum, and UA to evaluate. Discussed plan of care with patient. I informed them that labs and imaging will be ordered to evaluate  symptoms. Patient is understanding and agreeable with plan.     10:14 PM - Patient will be treated with Ativan for her symptoms at this time.     HYDRATION: Based on the patient's presentation of Acute Vomiting the patient was given IV fluids. IV Hydration was used because oral hydration was not adequate alone. Upon recheck following hydration, the patient was improved.    Chart reviewed and patient had a head CT with and without contrast that was unremarkable 2 years ago for vertigo.  She has never had an MRI.    MEDICAL DECISION MAKING:  Patient presents with severe vertigo and vomiting.  She has a gradual onset moderate headache.  She reported diplopia but has not here.  She has had multiple similar episodes in the past that have been less severe and more short-lived.  She is never had MRI.  Her symptoms are triggered by movement and opening arise which sounds peripheral but the prolonged duration of symptoms raises more concern for central vertigo.  She is not better after multiple treatments here and will be watched on ED observation overnight for planned MRI before disposition.    12:28 AM  11/11/22  Admit to ED observation for MRI for vertigo    Family History   Problem Relation Age of Onset    Diabetes Mother     Asthma Mother     Diabetes Father     Prostate enlargement (BPH) Father     Asthma Sister       12:28 AM  Care signed over to Dr. Ortiz    DISPOSITION: Pending    PLAN:  Further antiemetics, antivertigo medications and analgesics if needed, MRI brain currently fair    CONDITION:  Good.    Interim IMPRESSION:  1. Vertigo    ED observation care     I, Tanvi Montero (Luke), am scribing for, and in the presence of, Derrick Villalba M.D..    Electronically signed by: Tanvi Rooney), 11/10/2022    Electronically signed by: Tanvi Montero, 11/10/2022    The note accurately reflects work and decisions made by me.  Derrick Villalba M.D.  11/11/2022  12:29 AM

## 2022-11-11 NOTE — ED NOTES
Pt with attempted ambulation at bedside. Pt unable to ambulate without increased difficulty and dizziness. MD notified.

## 2022-11-11 NOTE — DISCHARGE INSTRUCTIONS
Take meclizine and/or lorazepam for vertigo.  Call to schedule an outpatient MRI with contrast to further evaluate your pituitary gland and follow-up with your doctor for the results afterwards.  Return for severe headache, loss of consciousness, weakness, numbness or facial droop.  Please ask your doctor to check a fasting glucose level.

## 2022-11-11 NOTE — DISCHARGE SUMMARY
"  ED Observation Discharge Summary    Patient:Izzy Mills  Patient : 1980  Patient MRN: [id number]  Patient PCP: Ana Ramsey M.D.    Admit Date: 11/10/2022  Discharge Date and Time: 22 9:28 AM  Discharge Diagnosis: vertigo  Discharge Attending: Emily Nunez M.D.  Discharge Service: ED Observation    ED Course  Izzy is a 42 y.o. female who was evaluated at [facility].  Patient was seen overnight for severe vertigo.  She was unable to ambulate well.  MRI was ordered for concern of cerebellar stroke.  Patient was signed out to me by Dr. King and was originally seen by Dr. Villalba.  I evaluated the patient who reported several episodes of vertigo in the past but this 1 is more severe.  Typically she lays down in the room and her vertigo lasts for several hours.  This 1 started at noon yesterday and involve more vomiting and an inability to maintain her balance with walking.  She feels improved this morning after fluids, Ativan, Antivert, and Zofran.  Patient was ambulated by RN staff and did well with a steady gait.  Good relief of her dizziness was minimal with head turning to sit back down on the stretcher.  Patient was awaiting MRI and I was asked to follow-up on this.    MRI is negative for stroke.  There is an irregular appearance to the pituitary gland requiring MRI with contrast as an outpatient work-up and chronic sinusitis.    Discharge Exam:  /61   Pulse 80   Temp 35.9 °C (96.6 °F) (Temporal)   Resp 18   Ht 1.651 m (5' 5\")   Wt 90.4 kg (199 lb 4.8 oz)   SpO2 95%   BMI 33.17 kg/m² .    Constitutional: Awake and alert. Nontoxic  HENT:  Grossly normal  Eyes: Grossly normal  Neck: Normal range of motion  Cardiovascular: Normal heart rate   Thorax & Lungs: No respiratory distress  Abdomen: Nontender  Skin:  No pathologic rash.   Extremities: Well perfused  Psychiatric: Affect normal    Labs  Results for orders placed or performed during the hospital encounter of " 11/10/22   CBC WITH DIFFERENTIAL   Result Value Ref Range    WBC 10.8 4.8 - 10.8 K/uL    RBC 5.90 (H) 4.20 - 5.40 M/uL    Hemoglobin 15.4 12.0 - 16.0 g/dL    Hematocrit 47.2 (H) 37.0 - 47.0 %    MCV 80.0 (L) 81.4 - 97.8 fL    MCH 26.1 (L) 27.0 - 33.0 pg    MCHC 32.6 (L) 33.6 - 35.0 g/dL    RDW 42.6 35.9 - 50.0 fL    Platelet Count 484 (H) 164 - 446 K/uL    MPV 8.8 (L) 9.0 - 12.9 fL    Neutrophils-Polys 70.40 44.00 - 72.00 %    Lymphocytes 23.70 22.00 - 41.00 %    Monocytes 4.40 0.00 - 13.40 %    Eosinophils 0.80 0.00 - 6.90 %    Basophils 0.30 0.00 - 1.80 %    Immature Granulocytes 0.40 0.00 - 0.90 %    Nucleated RBC 0.00 /100 WBC    Neutrophils (Absolute) 7.61 (H) 2.00 - 7.15 K/uL    Lymphs (Absolute) 2.56 1.00 - 4.80 K/uL    Monos (Absolute) 0.47 0.00 - 0.85 K/uL    Eos (Absolute) 0.09 0.00 - 0.51 K/uL    Baso (Absolute) 0.03 0.00 - 0.12 K/uL    Immature Granulocytes (abs) 0.04 0.00 - 0.11 K/uL    NRBC (Absolute) 0.00 K/uL   Basic Metabolic Panel   Result Value Ref Range    Sodium 136 135 - 145 mmol/L    Potassium 4.4 3.6 - 5.5 mmol/L    Chloride 100 96 - 112 mmol/L    Co2 22 20 - 33 mmol/L    Glucose 150 (H) 65 - 99 mg/dL    Bun 11 8 - 22 mg/dL    Creatinine 0.70 0.50 - 1.40 mg/dL    Calcium 9.8 8.4 - 10.2 mg/dL    Anion Gap 14.0 7.0 - 16.0   HCG QUAL SERUM   Result Value Ref Range    Beta-Hcg Qualitative Serum Negative Negative   URINALYSIS    Specimen: Urine   Result Value Ref Range    Micro Urine Req Microscopic    ESTIMATED GFR   Result Value Ref Range    GFR (CKD-EPI) 110 >60 mL/min/1.73 m 2   HEMOGLOBIN A1C   Result Value Ref Range    Glycohemoglobin 6.1 (H) 4.0 - 5.6 %    Est Avg Glucose 128 mg/dL       Radiology  MR-BRAIN-W/O   Final Result         No acute intracranial process.      Inflammatory mucosal thickening involving the paranasal sinuses as described above.      Slightly heterogeneous appearance to the pituitary gland. Consider follow-up pituitary MRI with contrast.            Medications:   New  Prescriptions    LORAZEPAM (ATIVAN) 0.5 MG TAB    Take 1 Tablet by mouth every 8 hours as needed (vertigo) for up to 5 days.    MECLIZINE (ANTIVERT) 25 MG TAB    Take 1 Tablet by mouth every 6 hours as needed for Dizziness, Nausea/Vomiting or Vertigo.       Discharge Condition: Stable    Electronically signed by: Emily Nunez M.D., 11/11/2022 9:28 AM

## 2022-11-14 ENCOUNTER — OFFICE VISIT (OUTPATIENT)
Dept: MEDICAL GROUP | Facility: MEDICAL CENTER | Age: 42
End: 2022-11-14
Payer: COMMERCIAL

## 2022-11-14 VITALS
TEMPERATURE: 97.9 F | DIASTOLIC BLOOD PRESSURE: 80 MMHG | SYSTOLIC BLOOD PRESSURE: 108 MMHG | BODY MASS INDEX: 33.66 KG/M2 | HEIGHT: 66 IN | WEIGHT: 209.44 LBS | OXYGEN SATURATION: 98 % | RESPIRATION RATE: 14 BRPM | HEART RATE: 82 BPM

## 2022-11-14 DIAGNOSIS — E23.7 PITUITARY ABNORMALITY (HCC): ICD-10-CM

## 2022-11-14 DIAGNOSIS — Z23 NEED FOR VACCINATION: ICD-10-CM

## 2022-11-14 DIAGNOSIS — E11.9 TYPE 2 DIABETES MELLITUS WITHOUT COMPLICATION, WITHOUT LONG-TERM CURRENT USE OF INSULIN (HCC): ICD-10-CM

## 2022-11-14 DIAGNOSIS — E55.9 HYPOVITAMINOSIS D: ICD-10-CM

## 2022-11-14 DIAGNOSIS — R42 VERTIGO: ICD-10-CM

## 2022-11-14 DIAGNOSIS — Z00.00 ENCOUNTER FOR MEDICAL EXAMINATION TO ESTABLISH CARE: ICD-10-CM

## 2022-11-14 PROCEDURE — 99214 OFFICE O/P EST MOD 30 MIN: CPT | Performed by: STUDENT IN AN ORGANIZED HEALTH CARE EDUCATION/TRAINING PROGRAM

## 2022-11-14 ASSESSMENT — PATIENT HEALTH QUESTIONNAIRE - PHQ9: CLINICAL INTERPRETATION OF PHQ2 SCORE: 0

## 2022-11-14 ASSESSMENT — FIBROSIS 4 INDEX: FIB4 SCORE: 0.39

## 2022-11-14 NOTE — PROGRESS NOTES
"Subjective:     CC:  Diagnoses of Pituitary abnormality (HCC), Vertigo, Need for vaccination, and Type 2 diabetes mellitus without complication, without long-term current use of insulin (HCC) were pertinent to this visit.    HISTORY OF THE PRESENT ILLNESS: Patient is a 42 y.o. female. This pleasant patient is here today to establish care and discuss the following.    Problem   Vertigo    Severe vertigo accompanied by nausea.  Last week patient was unable to sit up or move without severe vertigo.  She was unable to eat or drink and if she did she was not able to keep it down due to the nausea.  She was seen in the emergency department where labs were nonactionable and an MRI of the brain did not show any acute abnormality, but did show heterogenous pituitary.  At that time she was given Ativan and meclizine but has not picked this up from the pharmacy yet because her symptoms have somewhat improved.     Pituitary Abnormality (Hcc)    Incidentally seen on MR brain 11/2022     Type 2 Diabetes Mellitus Without Complication, Without Long-Term Current Use of Insulin (Hcc)    A1c was previously 7, last checked last week and was 6.1.  She is not taking any medications at this time.         ROS:   ROS      Objective:     Exam: /80 (BP Location: Left arm, Patient Position: Sitting, BP Cuff Size: Small adult)   Pulse 82   Temp 36.6 °C (97.9 °F) (Temporal)   Resp 14   Ht 1.676 m (5' 6\")   Wt 95 kg (209 lb 7 oz)   SpO2 98%  Body mass index is 33.8 kg/m².    Physical Exam  Vitals reviewed.   Constitutional:       General: She is not in acute distress.     Appearance: She is normal weight. She is not toxic-appearing.   HENT:      Head: Normocephalic and atraumatic.      Right Ear: External ear normal.      Left Ear: External ear normal.   Eyes:      General:         Right eye: No discharge.         Left eye: No discharge.      Extraocular Movements: Extraocular movements intact.      Conjunctiva/sclera: Conjunctivae " normal.   Cardiovascular:      Rate and Rhythm: Normal rate and regular rhythm.      Pulses: Normal pulses.      Heart sounds: Normal heart sounds. No murmur heard.  Pulmonary:      Effort: Pulmonary effort is normal. No respiratory distress.      Breath sounds: Normal breath sounds.   Abdominal:      General: Abdomen is flat. Bowel sounds are normal. There is no distension.      Palpations: Abdomen is soft.      Tenderness: There is no abdominal tenderness.   Musculoskeletal:         General: Normal range of motion.   Skin:     General: Skin is warm and dry.      Capillary Refill: Capillary refill takes less than 2 seconds.   Neurological:      Mental Status: She is alert.   Psychiatric:         Mood and Affect: Mood normal.         Behavior: Behavior normal.         Thought Content: Thought content normal.         Judgment: Judgment normal.         Assessment & Plan:   42 y.o. female with the following -    1. Encounter for medical examination to establish care  Medical history, problem list, medications and allergies reviewed    2. Pituitary abnormality (HCC)  Radiology report from MRI brain recommending pituitary MRI with contrast.  - MR-BRAIN PITUITARY W/WO; Future    3. Vertigo  Reassuring that MRI of the brain was unremarkable for acute disease.  Discussed taking the medication as needed for vertigo.  Discussed referral to physical therapy for vestibular therapy and to ENT.  Patient agreed.  - Referral to Physical Therapy  - Referral to ENT    4. Type 2 diabetes mellitus without complication, without long-term current use of insulin (HCC)  Well-controlled right now without any medication.  Recheck in 6 months.      Return in about 6 months (around 5/14/2023) for Annual.    Please note that this dictation was created using voice recognition software. I have made every reasonable attempt to correct obvious errors, but I expect that there are errors of grammar and possibly content that I did not discover before  finalizing the note.

## 2023-05-08 ENCOUNTER — APPOINTMENT (OUTPATIENT)
Dept: MEDICAL GROUP | Facility: MEDICAL CENTER | Age: 43
End: 2023-05-08
Payer: COMMERCIAL

## 2023-05-22 ENCOUNTER — PATIENT MESSAGE (OUTPATIENT)
Dept: MEDICAL GROUP | Facility: MEDICAL CENTER | Age: 43
End: 2023-05-22
Payer: COMMERCIAL

## 2023-05-22 DIAGNOSIS — R42 VERTIGO: ICD-10-CM

## 2023-05-22 RX ORDER — MECLIZINE HYDROCHLORIDE 25 MG/1
25 TABLET ORAL EVERY 6 HOURS PRN
Qty: 20 TABLET | Refills: 0 | Status: SHIPPED | OUTPATIENT
Start: 2023-05-22

## 2023-06-02 ENCOUNTER — PATIENT MESSAGE (OUTPATIENT)
Dept: MEDICAL GROUP | Facility: MEDICAL CENTER | Age: 43
End: 2023-06-02
Payer: COMMERCIAL

## 2023-06-05 ENCOUNTER — APPOINTMENT (OUTPATIENT)
Dept: MEDICAL GROUP | Facility: MEDICAL CENTER | Age: 43
End: 2023-06-05
Payer: COMMERCIAL

## 2023-06-06 ENCOUNTER — APPOINTMENT (OUTPATIENT)
Dept: MEDICAL GROUP | Facility: MEDICAL CENTER | Age: 43
End: 2023-06-06
Payer: COMMERCIAL

## 2023-06-09 ENCOUNTER — APPOINTMENT (OUTPATIENT)
Dept: MEDICAL GROUP | Facility: MEDICAL CENTER | Age: 43
End: 2023-06-09
Payer: COMMERCIAL

## 2023-06-10 ENCOUNTER — HOSPITAL ENCOUNTER (OUTPATIENT)
Dept: LAB | Facility: MEDICAL CENTER | Age: 43
End: 2023-06-10
Attending: STUDENT IN AN ORGANIZED HEALTH CARE EDUCATION/TRAINING PROGRAM
Payer: COMMERCIAL

## 2023-06-10 DIAGNOSIS — E55.9 HYPOVITAMINOSIS D: ICD-10-CM

## 2023-06-10 DIAGNOSIS — E11.9 TYPE 2 DIABETES MELLITUS WITHOUT COMPLICATION, WITHOUT LONG-TERM CURRENT USE OF INSULIN (HCC): ICD-10-CM

## 2023-06-10 DIAGNOSIS — R42 VERTIGO: ICD-10-CM

## 2023-06-10 LAB
25(OH)D3 SERPL-MCNC: 26 NG/ML (ref 30–100)
CHOLEST SERPL-MCNC: 209 MG/DL (ref 100–199)
EST. AVERAGE GLUCOSE BLD GHB EST-MCNC: 140 MG/DL
HBA1C MFR BLD: 6.5 % (ref 4–5.6)
HDLC SERPL-MCNC: 54 MG/DL
LDLC SERPL CALC-MCNC: 126 MG/DL
TRIGL SERPL-MCNC: 143 MG/DL (ref 0–149)
TSH SERPL DL<=0.005 MIU/L-ACNC: 2.51 UIU/ML (ref 0.38–5.33)

## 2023-06-10 PROCEDURE — 36415 COLL VENOUS BLD VENIPUNCTURE: CPT

## 2023-06-10 PROCEDURE — 84443 ASSAY THYROID STIM HORMONE: CPT

## 2023-06-10 PROCEDURE — 83036 HEMOGLOBIN GLYCOSYLATED A1C: CPT

## 2023-06-10 PROCEDURE — 82306 VITAMIN D 25 HYDROXY: CPT

## 2023-06-10 PROCEDURE — 80061 LIPID PANEL: CPT

## 2023-06-13 ENCOUNTER — HOSPITAL ENCOUNTER (OUTPATIENT)
Facility: MEDICAL CENTER | Age: 43
End: 2023-06-13
Attending: STUDENT IN AN ORGANIZED HEALTH CARE EDUCATION/TRAINING PROGRAM
Payer: COMMERCIAL

## 2023-06-13 ENCOUNTER — OFFICE VISIT (OUTPATIENT)
Dept: MEDICAL GROUP | Facility: MEDICAL CENTER | Age: 43
End: 2023-06-13
Payer: COMMERCIAL

## 2023-06-13 VITALS
HEIGHT: 66 IN | SYSTOLIC BLOOD PRESSURE: 108 MMHG | HEART RATE: 81 BPM | BODY MASS INDEX: 32.6 KG/M2 | WEIGHT: 202.82 LBS | TEMPERATURE: 97.3 F | RESPIRATION RATE: 16 BRPM | DIASTOLIC BLOOD PRESSURE: 60 MMHG | OXYGEN SATURATION: 97 %

## 2023-06-13 DIAGNOSIS — E11.9 TYPE 2 DIABETES MELLITUS WITHOUT COMPLICATION, WITHOUT LONG-TERM CURRENT USE OF INSULIN (HCC): ICD-10-CM

## 2023-06-13 DIAGNOSIS — E23.7 PITUITARY ABNORMALITY (HCC): ICD-10-CM

## 2023-06-13 DIAGNOSIS — Z00.00 ANNUAL PHYSICAL EXAM: ICD-10-CM

## 2023-06-13 LAB
CREAT UR-MCNC: 136.99 MG/DL
MICROALBUMIN UR-MCNC: <1.2 MG/DL
MICROALBUMIN/CREAT UR: NORMAL MG/G (ref 0–30)

## 2023-06-13 PROCEDURE — 3078F DIAST BP <80 MM HG: CPT | Performed by: STUDENT IN AN ORGANIZED HEALTH CARE EDUCATION/TRAINING PROGRAM

## 2023-06-13 PROCEDURE — 99396 PREV VISIT EST AGE 40-64: CPT | Performed by: STUDENT IN AN ORGANIZED HEALTH CARE EDUCATION/TRAINING PROGRAM

## 2023-06-13 PROCEDURE — 82570 ASSAY OF URINE CREATININE: CPT

## 2023-06-13 PROCEDURE — 3074F SYST BP LT 130 MM HG: CPT | Performed by: STUDENT IN AN ORGANIZED HEALTH CARE EDUCATION/TRAINING PROGRAM

## 2023-06-13 PROCEDURE — 82043 UR ALBUMIN QUANTITATIVE: CPT

## 2023-06-13 RX ORDER — FLASH GLUCOSE SCANNING READER
EACH MISCELLANEOUS
Qty: 2 EACH | Refills: 0 | Status: SHIPPED | OUTPATIENT
Start: 2023-06-13 | End: 2023-06-16

## 2023-06-13 RX ORDER — FLASH GLUCOSE SENSOR
KIT MISCELLANEOUS
Qty: 2 EACH | Refills: 0 | Status: SHIPPED | OUTPATIENT
Start: 2023-06-13

## 2023-06-13 ASSESSMENT — PATIENT HEALTH QUESTIONNAIRE - PHQ9: CLINICAL INTERPRETATION OF PHQ2 SCORE: 0

## 2023-06-13 NOTE — LETTER
[employer] [facility]  Maria Del Carmen Ayon M.D.  [address]  [place] [address]  Fax: [phone]   Authorization for Release/Disclosure of   Protected Health Information   Name: FIFI MILLS : 1980 SSN: [id number]   Address: [place] [address] Phone:    [phone] (home)    I authorize the entity listed below to release/disclose the PHI below to:   [facility]/Maria Del Carmen Ayon M.D. and Maria Del Carmen Ayon M.D.   Provider or Entity Name:  [facility]   Address   City, State, Zip  [place] Phone:      Fax:     Reason for request: continuity of care   Information to be released:    [  ] LAST COLONOSCOPY,  including any PATH REPORT and follow-up  [  ] LAST FIT/COLOGUARD RESULT [  ] LAST DEXA  [  ] LAST MAMMOGRAM  [  ] LAST PAP  [  ] LAST LABS [  ] RETINA EXAM REPORT  [  ] IMMUNIZATION RECORDS  [  ] Release all info      [  ] Check here and initial the line next to each item to release ALL health information INCLUDING  _____ Care and treatment for drug and / or alcohol abuse  _____ HIV testing, infection status, or AIDS  _____ Genetic Testing    DATES OF SERVICE OR TIME PERIOD TO BE DISCLOSED: _____________  I understand and acknowledge that:  * This Authorization may be revoked at any time by you in writing, except if your health information has already been used or disclosed.  * Your health information that will be used or disclosed as a result of you signing this authorization could be re-disclosed by the recipient. If this occurs, your re-disclosed health information may no longer be protected by State or Federal laws.  * You may refuse to sign this Authorization. Your refusal will not affect your ability to obtain treatment.  * This Authorization becomes effective upon signing and will  on (date) __________.      If no date is indicated, this Authorization will  one (1) year from the signature date.    Name: Fifi Mills  Signature:  Date:   6/13/2023     PLEASE FAX REQUESTED RECORDS BACK TO: [phone]

## 2023-06-13 NOTE — PROGRESS NOTES
Subjective:     CC:   Chief Complaint   Patient presents with    Annual Exam       HPI:   Izzy Mills is a 42 y.o. female who presents for annual exam. She is feeling well and denies any complaints.    Ob-Gyn/ History:    Patient has GYN provider: no  No history of abnormal pap smears.  Gyn Surgery:  oophorectomy .    Health Maintenance  Cholesterol Screening: Up to date   Diabetes Screening: Up to date   Aspirin Use: NA    Diet: Vegetarian, working on cutting out fast in carbs   Exercise: Not exercising at this time, she would like to start exercise again   Substance Abuse: None     Cancer screening  Colorectal Cancer Screening: Up-to-date   Lung Cancer Screening: N/A   Cervical Cancer Screening: Up-to-date   Breast Cancer Screening: N/A     Infectious disease screening/Immunizations  --Immunizations: Due for hepatitis B and pneumonia shot, patient defers for now    She  has a past medical history of Gynecological disorder, Jaundice, and Pain (09-).  She  has a past surgical history that includes dilation and curettage (); pelviscopy (N/A, 9/29/2016); ovarian cystectomy (Left, 9/29/2016); and oophorectomy (Left, 9/29/2016).    Family History   Problem Relation Age of Onset    Diabetes Mother     Asthma Mother     Diabetes Father     Prostate enlargement (BPH) Father     Asthma Sister     Lung Disease Maternal Grandmother     Lung Disease Maternal Grandfather     Breast Cancer Neg Hx     Colorectal Cancer Neg Hx     Peritoneal Cancer Neg Hx     Tubal Cancer Neg Hx     Ovarian Cancer Neg Hx        Social History     Socioeconomic History    Marital status:      Spouse name: Not on file    Number of children: Not on file    Years of education: Not on file    Highest education level: Not on file   Occupational History    Not on file   Tobacco Use    Smoking status: Never    Smokeless tobacco: Never   Vaping Use    Vaping Use: Never used   Substance and Sexual Activity    Alcohol use: Yes  "    Alcohol/week: 0.0 oz     Comment: 1-2 per 6 months    Drug use: No    Sexual activity: Yes     Partners: Male     Birth control/protection: Condom     Comment:    Other Topics Concern    Not on file   Social History Narrative    Not on file     Social Determinants of Health     Financial Resource Strain: Not on file   Food Insecurity: Not on file   Transportation Needs: Not on file   Physical Activity: Not on file   Stress: Not on file   Social Connections: Not on file   Intimate Partner Violence: Not on file   Housing Stability: Not on file       Patient Active Problem List    Diagnosis Date Noted    Vertigo 11/14/2022    Pituitary abnormality (HCC) 11/14/2022    Proctalgia fugax 08/07/2018    Type 2 diabetes mellitus without complication, without long-term current use of insulin (HCC) 08/07/2018    Health care maintenance 06/28/2018    Obesity (BMI 30.0-34.9) 06/28/2018    Ovarian retention cyst 09/29/2016         Current Outpatient Medications   Medication Sig Dispense Refill    Continuous Blood Gluc Sensor (FREESTYLE RICO 14 DAY SENSOR) Misc Use to check blood sugar 2 Each 0    Continuous Blood Gluc  (FREESTYLE RICO 14 DAY READER) Device Use to check blood sugars 2 Each 0    meclizine (ANTIVERT) 25 MG Tab Take 1 Tablet by mouth every 6 hours as needed for Dizziness, Nausea/Vomiting or Vertigo. 20 Tablet 0    ibuprofen (MOTRIN) 200 MG Tab Take 3 Tablets by mouth every 6 hours as needed. Indications: Pain      acetaminophen (TYLENOL) 500 MG Tab Take 1-2 Tablets by mouth every 6 hours as needed. Indications: Pain       No current facility-administered medications for this visit.     No Known Allergies      Objective:     /60   Pulse 81   Temp 36.3 °C (97.3 °F) (Temporal)   Resp 16   Ht 1.676 m (5' 6\")   Wt 92 kg (202 lb 13.2 oz)   SpO2 97%   BMI 32.74 kg/m²   Body mass index is 32.74 kg/m².  Wt Readings from Last 4 Encounters:   06/13/23 92 kg (202 lb 13.2 oz)   11/14/22 95 kg (209 " lb 7 oz)   11/10/22 90.4 kg (199 lb 4.8 oz)   06/17/21 90.4 kg (199 lb 4.8 oz)       Physical Exam:  Constitutional: Well-developed and well-nourished. Not diaphoretic. No distress.   Skin: Skin is warm and dry. No rash noted.  Head: Atraumatic without lesions.  Eyes: Conjunctivae and extraocular motions are normal. Pupils are equal, round, and reactive to light. No scleral icterus.   Ears:  External ears unremarkable. Tympanic membranes clear and intact.  Nose: Nares patent. Septum midline. Turbinates without erythema nor edema. No discharge.   Mouth/Throat: Dentition is normal. Tongue normal. Oropharynx is clear and moist. Posterior pharynx without erythema or exudates.  Neck: Supple, trachea midline. Normal range of motion. No thyromegaly present. No lymphadenopathy--cervical or supraclavicular.  Cardiovascular: Regular rate and rhythm, S1 and S2 without murmur, rubs, or gallops.  Lungs: Normal inspiratory effort, CTA bilaterally, no wheezes/rhonchi/rales  Abdomen: Soft, non tender, and without distention. Active bowel sounds in all four quadrants. No rebound, guarding, masses or HSM.  Extremities: No cyanosis, clubbing, erythema, nor edema.  Diabetic foot exam completed, sensation intact 3 out of 3 places on the feet  Musculoskeletal: All major joints AROM full in all directions without pain.  Neurological: Alert and oriented x 3.  No cranial nerve deficit. 5/5 myotomes.    Psychiatric:  Behavior, mood, and affect are appropriate.    Assessment and Plan:     1. Annual physical exam  Discussed healthy diet and exercise, routine screenings and vaccinations, return in 1 year for annual    2. Type 2 diabetes mellitus without complication, without long-term current use of insulin (HCC)  Diet controlled, labs as below, monofilament exam completed today  - MICROALBUMIN CREAT RATIO URINE; Future  - Diabetic Monofilament LE Exam  - Continuous Blood Gluc Sensor (FREESTYLE RICO 14 DAY SENSOR) Misc; Use to check blood  sugar  Dispense: 2 Each; Refill: 0  - Continuous Blood Gluc  (FREESTYLE RICO 14 DAY READER) Device; Use to check blood sugars  Dispense: 2 Each; Refill: 0    3. Pituitary abnormality (HCC)  Discussed the importance of following up with the MRI of the pituitary.  Patient understands    Please note that this dictation was created using voice recognition software. I have made every reasonable attempt to correct obvious errors, but I expect that there are errors of grammar and possibly content that I did not discover before finalizing the note.    Follow-up: No follow-ups on file.

## 2023-06-15 DIAGNOSIS — E11.9 TYPE 2 DIABETES MELLITUS WITHOUT COMPLICATION, WITHOUT LONG-TERM CURRENT USE OF INSULIN (HCC): ICD-10-CM

## 2023-06-15 NOTE — TELEPHONE ENCOUNTER
Received request via: Pharmacy    Was the patient seen in the last year in this department? Yes    Does the patient have an active prescription (recently filled or refills available) for medication(s) requested? Yes.  PLEASE CHANGE TO RICO 2 READER.  Does the patient have group home Plus and need 100 day supply (blood pressure, diabetes and cholesterol meds only)? Patient does not have SCP

## 2023-11-02 DIAGNOSIS — E11.9 TYPE 2 DIABETES MELLITUS WITHOUT COMPLICATION, WITHOUT LONG-TERM CURRENT USE OF INSULIN (HCC): ICD-10-CM

## 2023-11-02 DIAGNOSIS — Z11.59 NEED FOR HEPATITIS C SCREENING TEST: ICD-10-CM

## 2023-11-02 DIAGNOSIS — Z11.4 ENCOUNTER FOR SCREENING FOR HIV: ICD-10-CM

## 2025-02-10 ENCOUNTER — TELEPHONE (OUTPATIENT)
Dept: MEDICAL GROUP | Facility: MEDICAL CENTER | Age: 45
End: 2025-02-10
Payer: COMMERCIAL

## 2025-02-10 DIAGNOSIS — E11.9 TYPE 2 DIABETES MELLITUS WITHOUT COMPLICATION, WITHOUT LONG-TERM CURRENT USE OF INSULIN (HCC): ICD-10-CM

## 2025-02-10 NOTE — TELEPHONE ENCOUNTER
VOICEMAIL  1. Caller Name: shai                      Call Back Number: [phone]    2. Message: Called stating that she has her annual and was requesting labs before then to discuss at appt time    3. Patient approves office to leave a detailed voicemail/MyChart message: N\A

## 2025-02-14 ENCOUNTER — APPOINTMENT (OUTPATIENT)
Dept: RADIOLOGY | Facility: MEDICAL CENTER | Age: 45
End: 2025-02-14
Attending: STUDENT IN AN ORGANIZED HEALTH CARE EDUCATION/TRAINING PROGRAM
Payer: COMMERCIAL

## 2025-02-14 DIAGNOSIS — Z12.31 VISIT FOR SCREENING MAMMOGRAM: ICD-10-CM

## 2025-02-15 ENCOUNTER — OFFICE VISIT (OUTPATIENT)
Dept: URGENT CARE | Facility: CLINIC | Age: 45
End: 2025-02-15
Payer: COMMERCIAL

## 2025-02-15 VITALS
DIASTOLIC BLOOD PRESSURE: 76 MMHG | SYSTOLIC BLOOD PRESSURE: 124 MMHG | HEIGHT: 66 IN | RESPIRATION RATE: 18 BRPM | OXYGEN SATURATION: 98 % | TEMPERATURE: 97 F | HEART RATE: 100 BPM | BODY MASS INDEX: 32.47 KG/M2 | WEIGHT: 202 LBS

## 2025-02-15 DIAGNOSIS — J06.9 VIRAL URI WITH COUGH: ICD-10-CM

## 2025-02-15 PROCEDURE — 3074F SYST BP LT 130 MM HG: CPT | Performed by: PHYSICIAN ASSISTANT

## 2025-02-15 PROCEDURE — 99214 OFFICE O/P EST MOD 30 MIN: CPT | Performed by: PHYSICIAN ASSISTANT

## 2025-02-15 PROCEDURE — 3078F DIAST BP <80 MM HG: CPT | Performed by: PHYSICIAN ASSISTANT

## 2025-02-15 RX ORDER — ALBUTEROL SULFATE 90 UG/1
2 INHALANT RESPIRATORY (INHALATION) EVERY 6 HOURS PRN
Qty: 8.5 G | Refills: 0 | Status: SHIPPED | OUTPATIENT
Start: 2025-02-15

## 2025-02-15 RX ORDER — DEXTROMETHORPHAN HYDROBROMIDE AND PROMETHAZINE HYDROCHLORIDE 15; 6.25 MG/5ML; MG/5ML
5 SYRUP ORAL 3 TIMES DAILY PRN
Qty: 120 ML | Refills: 0 | Status: SHIPPED | OUTPATIENT
Start: 2025-02-15

## 2025-02-15 ASSESSMENT — ENCOUNTER SYMPTOMS
NAUSEA: 0
CARDIOVASCULAR NEGATIVE: 1
FEVER: 0
HEADACHES: 0
MYALGIAS: 0
DIZZINESS: 0
DIARRHEA: 0
HEMOPTYSIS: 0
RHINORRHEA: 0
ABDOMINAL PAIN: 0
SHORTNESS OF BREATH: 0
SPUTUM PRODUCTION: 0
SORE THROAT: 0
CHILLS: 0
VOMITING: 0
MUSCULOSKELETAL NEGATIVE: 1
WHEEZING: 0
COUGH: 1

## 2025-02-16 NOTE — PROGRESS NOTES
Subjective     Izzy Mills is a very pleasant 44 y.o. female who presents with Cough (X 1 day/ Chest congestion)            Of note, patient was sick 1 to 2 weeks ago with similar cough and congestion.  She did have multiple days symptom-free.    Cough  This is a new problem. The current episode started yesterday. The problem has been gradually worsening. The problem occurs every few minutes. The cough is Non-productive. Pertinent negatives include no chest pain, chills, ear pain, fever, headaches, hemoptysis, myalgias, nasal congestion, postnasal drip, rhinorrhea, sore throat, shortness of breath or wheezing. The symptoms are aggravated by lying down. She has tried OTC cough suppressant for the symptoms. The treatment provided mild relief. There is no history of asthma or pneumonia.       PMH:  has a past medical history of Gynecological disorder, Jaundice, and Pain (09-).  MEDS:   Current Outpatient Medications:     Continuous Blood Gluc Sensor (FREESTYLE RICO 2 SENSOR) Misc, Use to check blood sugars, Disp: 1 Each, Rfl: 3    Continuous Blood Gluc Sensor (FREESTYLE RICO 14 DAY SENSOR) Misc, Use to check blood sugar, Disp: 2 Each, Rfl: 0    meclizine (ANTIVERT) 25 MG Tab, Take 1 Tablet by mouth every 6 hours as needed for Dizziness, Nausea/Vomiting or Vertigo., Disp: 20 Tablet, Rfl: 0    ibuprofen (MOTRIN) 200 MG Tab, Take 3 Tablets by mouth every 6 hours as needed. Indications: Pain, Disp: , Rfl:     acetaminophen (TYLENOL) 500 MG Tab, Take 1-2 Tablets by mouth every 6 hours as needed. Indications: Pain, Disp: , Rfl:     Continuous Blood Gluc  (FREESTYLE RICO 2 READER) Device, Use to check blood sugars, Disp: 1 Each, Rfl: 3  ALLERGIES: No Known Allergies  SURGHX:   Past Surgical History:   Procedure Laterality Date    PELVISCOPY N/A 9/29/2016    Procedure: PELVISCOPY;  Surgeon: Larisa Broussard M.D.;  Location: SURGERY SAME DAY [facility];  Service:     OVARIAN CYSTECTOMY Left  "9/29/2016    Procedure: OVARIAN CYSTECTOMY;  Surgeon: Larisa Broussard M.D.;  Location: SURGERY SAME DAY [facility];  Service:     OOPHORECTOMY Left 9/29/2016    Procedure: Salpingo-OOPHORECTOMY;  Surgeon: Larisa Broussard M.D.;  Location: SURGERY SAME DAY [facility];  Service:     DILATION AND CURETTAGE      D & C     SOCHX:  reports that she has never smoked. She has never used smokeless tobacco. She reports current alcohol use. She reports that she does not use drugs.  FH: family history includes Asthma in her mother and sister; Diabetes in her father and mother; Lung Disease in her maternal grandfather and maternal grandmother; Prostate enlargement (BPH) in her father.      Review of Systems   Constitutional:  Negative for chills, fever and malaise/fatigue.   HENT:  Negative for congestion, ear pain, postnasal drip, rhinorrhea and sore throat.    Respiratory:  Positive for cough. Negative for hemoptysis, sputum production, shortness of breath and wheezing.    Cardiovascular: Negative.  Negative for chest pain.   Gastrointestinal:  Negative for abdominal pain, diarrhea, nausea and vomiting.   Musculoskeletal: Negative.  Negative for myalgias.   Neurological:  Negative for dizziness and headaches.       Medications, Allergies, and current problem list reviewed today in Epic         Objective     /76   Pulse 100   Temp 36.1 °C (97 °F) (Temporal)   Resp 18   Ht 1.676 m (5' 6\")   Wt 91.6 kg (202 lb)   SpO2 98%   BMI 32.60 kg/m²      Physical Exam  Vitals and nursing note reviewed.   Constitutional:       General: She is not in acute distress.     Appearance: Normal appearance. She is well-developed. She is not ill-appearing, toxic-appearing or diaphoretic.   HENT:      Head: Normocephalic and atraumatic.      Right Ear: Tympanic membrane, ear canal and external ear normal.      Left Ear: Tympanic membrane, ear canal and external ear normal.      Nose: Rhinorrhea present. No congestion.     "  Mouth/Throat:      Mouth: Mucous membranes are moist.      Pharynx: No oropharyngeal exudate or posterior oropharyngeal erythema.   Eyes:      General:         Right eye: No discharge.         Left eye: No discharge.      Conjunctiva/sclera: Conjunctivae normal.   Cardiovascular:      Rate and Rhythm: Normal rate and regular rhythm.      Pulses: Normal pulses.      Heart sounds: Normal heart sounds.   Pulmonary:      Effort: Pulmonary effort is normal. No respiratory distress.      Breath sounds: Normal breath sounds. No stridor. No wheezing, rhonchi or rales.   Musculoskeletal:      Cervical back: Normal range of motion and neck supple.      Right lower leg: No edema.      Left lower leg: No edema.   Lymphadenopathy:      Cervical: No cervical adenopathy.   Skin:     General: Skin is warm and dry.   Neurological:      General: No focal deficit present.      Mental Status: She is alert and oriented to person, place, and time. Mental status is at baseline.   Psychiatric:         Mood and Affect: Mood normal.         Behavior: Behavior normal.         Thought Content: Thought content normal.         Judgment: Judgment normal.                                  Assessment & Plan  Viral URI with cough  This is a very pleasant 44-year-old female presenting with cough, runny nose and congestion since yesterday.  She denies fever chills or bodyaches.  She is eating and drinking without vomiting or diarrhea.  No shortness of breath, chest pain, wheezing, hemoptysis, leg swelling or calf tenderness.  Of note she was sick 1 to 2 weeks ago with similar symptoms but did have multiple days symptom-free.  She has no pertinent past respiratory history.  Her vital signs are stable and her pO2 is adequate.  Her lungs are clear showing no lower respiratory involvement.  Remainder of exam reassuring. No clinical symptoms/signs of focal bacterial infection.  Patient will be treated for self-limiting viral URI with OTC meds, conservative  measures, and symptomatic relief.  ER and red flag symptoms discussed at length.    Orders:    promethazine-dextromethorphan (PROMETHAZINE-DM) 6.25-15 MG/5ML syrup; Take 5 mL by mouth 3 times a day as needed for Cough.    albuterol 108 (90 Base) MCG/ACT Aero Soln inhalation aerosol; Inhale 2 Puffs every 6 hours as needed for Shortness of Breath.           I personally reviewed prior external notes and test results pertinent to today's visit. Return to clinic or go to ED if symptoms worsen or persist. Red flag symptoms and indications for ED discussed at length. Patient/Parent/Guardian voices understanding.  AVS with post-visit instructions printed and provided or given verbally.  Follow-up with your primary care provider in 3-5 days. All side effects and potential interactions of prescribed medication discussed including allergic response, GI upset, tendon injury, rash, sedation, OCP effectiveness, etc.      Please note that this dictation was created using voice recognition software. I have made every reasonable attempt to correct obvious errors, but I expect that there are errors of grammar and possibly content that I did not discover before finalizing the note.

## 2025-02-18 ENCOUNTER — APPOINTMENT (OUTPATIENT)
Dept: MEDICAL GROUP | Age: 45
End: 2025-02-18
Payer: COMMERCIAL

## 2025-03-01 ENCOUNTER — HOSPITAL ENCOUNTER (OUTPATIENT)
Dept: LAB | Facility: MEDICAL CENTER | Age: 45
End: 2025-03-01
Attending: STUDENT IN AN ORGANIZED HEALTH CARE EDUCATION/TRAINING PROGRAM
Payer: COMMERCIAL

## 2025-03-01 DIAGNOSIS — E11.9 TYPE 2 DIABETES MELLITUS WITHOUT COMPLICATION, WITHOUT LONG-TERM CURRENT USE OF INSULIN (HCC): ICD-10-CM

## 2025-03-01 LAB
ALBUMIN SERPL BCP-MCNC: 3.9 G/DL (ref 3.2–4.9)
ALBUMIN/GLOB SERPL: 1.1 G/DL
ALP SERPL-CCNC: 138 U/L (ref 30–99)
ALT SERPL-CCNC: 82 U/L (ref 2–50)
ANION GAP SERPL CALC-SCNC: 12 MMOL/L (ref 7–16)
AST SERPL-CCNC: 74 U/L (ref 12–45)
BILIRUB SERPL-MCNC: 0.5 MG/DL (ref 0.1–1.5)
BUN SERPL-MCNC: 11 MG/DL (ref 8–22)
CALCIUM ALBUM COR SERPL-MCNC: 9.7 MG/DL (ref 8.5–10.5)
CALCIUM SERPL-MCNC: 9.6 MG/DL (ref 8.5–10.5)
CHLORIDE SERPL-SCNC: 105 MMOL/L (ref 96–112)
CHOLEST SERPL-MCNC: 197 MG/DL (ref 100–199)
CO2 SERPL-SCNC: 23 MMOL/L (ref 20–33)
CREAT SERPL-MCNC: 0.75 MG/DL (ref 0.5–1.4)
CREAT UR-MCNC: 80.4 MG/DL
EST. AVERAGE GLUCOSE BLD GHB EST-MCNC: 169 MG/DL
FASTING STATUS PATIENT QL REPORTED: NORMAL
GFR SERPLBLD CREATININE-BSD FMLA CKD-EPI: 100 ML/MIN/1.73 M 2
GLOBULIN SER CALC-MCNC: 3.7 G/DL (ref 1.9–3.5)
GLUCOSE SERPL-MCNC: 107 MG/DL (ref 65–99)
HBA1C MFR BLD: 7.5 % (ref 4–5.6)
HDLC SERPL-MCNC: 61 MG/DL
LDLC SERPL CALC-MCNC: 114 MG/DL
MICROALBUMIN UR-MCNC: <1.2 MG/DL
MICROALBUMIN/CREAT UR: NORMAL MG/G (ref 0–30)
POTASSIUM SERPL-SCNC: 4.1 MMOL/L (ref 3.6–5.5)
PROT SERPL-MCNC: 7.6 G/DL (ref 6–8.2)
SODIUM SERPL-SCNC: 140 MMOL/L (ref 135–145)
TRIGL SERPL-MCNC: 112 MG/DL (ref 0–149)

## 2025-03-01 PROCEDURE — 80053 COMPREHEN METABOLIC PANEL: CPT

## 2025-03-01 PROCEDURE — 82043 UR ALBUMIN QUANTITATIVE: CPT

## 2025-03-01 PROCEDURE — 83036 HEMOGLOBIN GLYCOSYLATED A1C: CPT

## 2025-03-01 PROCEDURE — 36415 COLL VENOUS BLD VENIPUNCTURE: CPT

## 2025-03-01 PROCEDURE — 80061 LIPID PANEL: CPT

## 2025-03-01 PROCEDURE — 82570 ASSAY OF URINE CREATININE: CPT

## 2025-03-03 ENCOUNTER — APPOINTMENT (OUTPATIENT)
Facility: MEDICAL CENTER | Age: 45
End: 2025-03-03
Payer: COMMERCIAL

## 2025-03-03 ENCOUNTER — RESULTS FOLLOW-UP (OUTPATIENT)
Dept: MEDICAL GROUP | Facility: MEDICAL CENTER | Age: 45
End: 2025-03-03

## 2025-03-03 ENCOUNTER — APPOINTMENT (OUTPATIENT)
Dept: MEDICAL GROUP | Age: 45
End: 2025-03-03
Payer: COMMERCIAL

## 2025-03-30 SDOH — ECONOMIC STABILITY: FOOD INSECURITY: WITHIN THE PAST 12 MONTHS, YOU WORRIED THAT YOUR FOOD WOULD RUN OUT BEFORE YOU GOT MONEY TO BUY MORE.: NEVER TRUE

## 2025-03-30 SDOH — ECONOMIC STABILITY: FOOD INSECURITY: WITHIN THE PAST 12 MONTHS, THE FOOD YOU BOUGHT JUST DIDN'T LAST AND YOU DIDN'T HAVE MONEY TO GET MORE.: NEVER TRUE

## 2025-03-30 SDOH — HEALTH STABILITY: PHYSICAL HEALTH: ON AVERAGE, HOW MANY MINUTES DO YOU ENGAGE IN EXERCISE AT THIS LEVEL?: 20 MIN

## 2025-03-30 SDOH — ECONOMIC STABILITY: INCOME INSECURITY: IN THE LAST 12 MONTHS, WAS THERE A TIME WHEN YOU WERE NOT ABLE TO PAY THE MORTGAGE OR RENT ON TIME?: NO

## 2025-03-30 SDOH — ECONOMIC STABILITY: TRANSPORTATION INSECURITY
IN THE PAST 12 MONTHS, HAS LACK OF TRANSPORTATION KEPT YOU FROM MEETINGS, WORK, OR FROM GETTING THINGS NEEDED FOR DAILY LIVING?: NO

## 2025-03-30 SDOH — HEALTH STABILITY: PHYSICAL HEALTH: ON AVERAGE, HOW MANY DAYS PER WEEK DO YOU ENGAGE IN MODERATE TO STRENUOUS EXERCISE (LIKE A BRISK WALK)?: 1 DAY

## 2025-03-30 SDOH — ECONOMIC STABILITY: TRANSPORTATION INSECURITY
IN THE PAST 12 MONTHS, HAS THE LACK OF TRANSPORTATION KEPT YOU FROM MEDICAL APPOINTMENTS OR FROM GETTING MEDICATIONS?: NO

## 2025-03-30 SDOH — ECONOMIC STABILITY: INCOME INSECURITY: HOW HARD IS IT FOR YOU TO PAY FOR THE VERY BASICS LIKE FOOD, HOUSING, MEDICAL CARE, AND HEATING?: NOT HARD AT ALL

## 2025-03-30 SDOH — HEALTH STABILITY: MENTAL HEALTH
STRESS IS WHEN SOMEONE FEELS TENSE, NERVOUS, ANXIOUS, OR CAN'T SLEEP AT NIGHT BECAUSE THEIR MIND IS TROUBLED. HOW STRESSED ARE YOU?: ONLY A LITTLE

## 2025-03-30 SDOH — ECONOMIC STABILITY: TRANSPORTATION INSECURITY
IN THE PAST 12 MONTHS, HAS LACK OF RELIABLE TRANSPORTATION KEPT YOU FROM MEDICAL APPOINTMENTS, MEETINGS, WORK OR FROM GETTING THINGS NEEDED FOR DAILY LIVING?: NO

## 2025-03-30 SDOH — ECONOMIC STABILITY: HOUSING INSECURITY
IN THE LAST 12 MONTHS, WAS THERE A TIME WHEN YOU DID NOT HAVE A STEADY PLACE TO SLEEP OR SLEPT IN A SHELTER (INCLUDING NOW)?: NO

## 2025-03-30 ASSESSMENT — SOCIAL DETERMINANTS OF HEALTH (SDOH)
HOW OFTEN DO YOU ATTEND CHURCH OR RELIGIOUS SERVICES?: 1 TO 4 TIMES PER YEAR
HOW HARD IS IT FOR YOU TO PAY FOR THE VERY BASICS LIKE FOOD, HOUSING, MEDICAL CARE, AND HEATING?: NOT HARD AT ALL
HOW OFTEN DO YOU HAVE SIX OR MORE DRINKS ON ONE OCCASION: NEVER
HOW MANY DRINKS CONTAINING ALCOHOL DO YOU HAVE ON A TYPICAL DAY WHEN YOU ARE DRINKING: 1 OR 2
HOW OFTEN DO YOU ATTENT MEETINGS OF THE CLUB OR ORGANIZATION YOU BELONG TO?: NEVER
WITHIN THE PAST 12 MONTHS, YOU WORRIED THAT YOUR FOOD WOULD RUN OUT BEFORE YOU GOT THE MONEY TO BUY MORE: NEVER TRUE
DO YOU BELONG TO ANY CLUBS OR ORGANIZATIONS SUCH AS CHURCH GROUPS UNIONS, FRATERNAL OR ATHLETIC GROUPS, OR SCHOOL GROUPS?: NO
HOW OFTEN DO YOU GET TOGETHER WITH FRIENDS OR RELATIVES?: ONCE A WEEK
IN THE PAST 12 MONTHS, HAS THE ELECTRIC, GAS, OIL, OR WATER COMPANY THREATENED TO SHUT OFF SERVICE IN YOUR HOME?: NO
HOW OFTEN DO YOU ATTEND CHURCH OR RELIGIOUS SERVICES?: 1 TO 4 TIMES PER YEAR
IN A TYPICAL WEEK, HOW MANY TIMES DO YOU TALK ON THE PHONE WITH FAMILY, FRIENDS, OR NEIGHBORS?: THREE TIMES A WEEK
HOW OFTEN DO YOU ATTENT MEETINGS OF THE CLUB OR ORGANIZATION YOU BELONG TO?: NEVER
DO YOU BELONG TO ANY CLUBS OR ORGANIZATIONS SUCH AS CHURCH GROUPS UNIONS, FRATERNAL OR ATHLETIC GROUPS, OR SCHOOL GROUPS?: NO
HOW OFTEN DO YOU HAVE A DRINK CONTAINING ALCOHOL: MONTHLY OR LESS
HOW OFTEN DO YOU GET TOGETHER WITH FRIENDS OR RELATIVES?: ONCE A WEEK
IN A TYPICAL WEEK, HOW MANY TIMES DO YOU TALK ON THE PHONE WITH FAMILY, FRIENDS, OR NEIGHBORS?: THREE TIMES A WEEK

## 2025-03-30 ASSESSMENT — LIFESTYLE VARIABLES
HOW MANY STANDARD DRINKS CONTAINING ALCOHOL DO YOU HAVE ON A TYPICAL DAY: 1 OR 2
AUDIT-C TOTAL SCORE: 1
HOW OFTEN DO YOU HAVE SIX OR MORE DRINKS ON ONE OCCASION: NEVER
SKIP TO QUESTIONS 9-10: 1
HOW OFTEN DO YOU HAVE A DRINK CONTAINING ALCOHOL: MONTHLY OR LESS

## 2025-04-01 ENCOUNTER — APPOINTMENT (OUTPATIENT)
Dept: MEDICAL GROUP | Facility: MEDICAL CENTER | Age: 45
End: 2025-04-01
Payer: COMMERCIAL

## 2025-04-01 VITALS
SYSTOLIC BLOOD PRESSURE: 112 MMHG | BODY MASS INDEX: 32.88 KG/M2 | HEIGHT: 66 IN | WEIGHT: 204.6 LBS | OXYGEN SATURATION: 100 % | TEMPERATURE: 96.6 F | HEART RATE: 74 BPM | DIASTOLIC BLOOD PRESSURE: 72 MMHG

## 2025-04-01 DIAGNOSIS — Z23 IMMUNIZATION DUE: ICD-10-CM

## 2025-04-01 DIAGNOSIS — R74.01 TRANSAMINITIS: ICD-10-CM

## 2025-04-01 DIAGNOSIS — Z12.31 ENCOUNTER FOR SCREENING MAMMOGRAM FOR MALIGNANT NEOPLASM OF BREAST: ICD-10-CM

## 2025-04-01 DIAGNOSIS — Z00.00 ANNUAL PHYSICAL EXAM: ICD-10-CM

## 2025-04-01 DIAGNOSIS — Z12.11 COLON CANCER SCREENING: ICD-10-CM

## 2025-04-01 DIAGNOSIS — E11.9 TYPE 2 DIABETES MELLITUS WITHOUT COMPLICATION, WITHOUT LONG-TERM CURRENT USE OF INSULIN (HCC): ICD-10-CM

## 2025-04-01 DIAGNOSIS — E78.00 ELEVATED LDL CHOLESTEROL LEVEL: ICD-10-CM

## 2025-04-01 PROCEDURE — 99396 PREV VISIT EST AGE 40-64: CPT | Mod: 25 | Performed by: STUDENT IN AN ORGANIZED HEALTH CARE EDUCATION/TRAINING PROGRAM

## 2025-04-01 PROCEDURE — 90471 IMMUNIZATION ADMIN: CPT | Performed by: STUDENT IN AN ORGANIZED HEALTH CARE EDUCATION/TRAINING PROGRAM

## 2025-04-01 PROCEDURE — 3078F DIAST BP <80 MM HG: CPT | Performed by: STUDENT IN AN ORGANIZED HEALTH CARE EDUCATION/TRAINING PROGRAM

## 2025-04-01 PROCEDURE — 90472 IMMUNIZATION ADMIN EACH ADD: CPT | Performed by: STUDENT IN AN ORGANIZED HEALTH CARE EDUCATION/TRAINING PROGRAM

## 2025-04-01 PROCEDURE — 3074F SYST BP LT 130 MM HG: CPT | Performed by: STUDENT IN AN ORGANIZED HEALTH CARE EDUCATION/TRAINING PROGRAM

## 2025-04-01 PROCEDURE — 90677 PCV20 VACCINE IM: CPT | Performed by: STUDENT IN AN ORGANIZED HEALTH CARE EDUCATION/TRAINING PROGRAM

## 2025-04-01 PROCEDURE — 90715 TDAP VACCINE 7 YRS/> IM: CPT | Performed by: STUDENT IN AN ORGANIZED HEALTH CARE EDUCATION/TRAINING PROGRAM

## 2025-04-01 RX ORDER — FLASH GLUCOSE SENSOR
KIT MISCELLANEOUS
Qty: 2 EACH | Refills: 0 | Status: SHIPPED | OUTPATIENT
Start: 2025-04-01

## 2025-04-01 ASSESSMENT — PATIENT HEALTH QUESTIONNAIRE - PHQ9
5. POOR APPETITE OR OVEREATING: 1 - SEVERAL DAYS
CLINICAL INTERPRETATION OF PHQ2 SCORE: 1
SUM OF ALL RESPONSES TO PHQ QUESTIONS 1-9: 5

## 2025-04-01 NOTE — PROGRESS NOTES
Subjective:     CC:   Chief Complaint   Patient presents with    Annual Exam    Lab Results     Would like a follow up with labs        HPI:   Izzy Mills is a 44 y.o. female who presents for annual exam. She is feeling well and denies any complaints.    She has had a recent increase in her A1c.  She is under what she can do to bring this down without medications.  We reviewed her cholesterol which has improved from previous but is still elevated.  She also has new transaminitis.    Cancer screening  Colorectal Cancer Screening: Ordered    Lung Cancer Screening: NA    Cervical Cancer Screening: Up to date   Breast Cancer Screening: Ordered     Infectious disease screening/Immunizations  --Immunizations: Pneumonia shot and Tdap completed today    She  has a past medical history of Gynecological disorder, Jaundice, and Pain (09-).  She  has a past surgical history that includes dilation and curettage (); pelviscopy (N/A, 9/29/2016); ovarian cystectomy (Left, 9/29/2016); and oophorectomy (Left, 9/29/2016).    Family History   Problem Relation Age of Onset    Diabetes Mother     Asthma Mother     Diabetes Father     Prostate enlargement (BPH) Father     Asthma Sister     Lung Disease Maternal Grandmother     Lung Disease Maternal Grandfather     Breast Cancer Neg Hx     Colorectal Cancer Neg Hx     Peritoneal Cancer Neg Hx     Tubal Cancer Neg Hx     Ovarian Cancer Neg Hx        Social History     Socioeconomic History    Marital status:      Spouse name: Not on file    Number of children: Not on file    Years of education: Not on file    Highest education level: Master's degree (e.g., MA, MS, [employer], MEd, MSW, [employer])   Occupational History    Not on file   Tobacco Use    Smoking status: Never    Smokeless tobacco: Never   Vaping Use    Vaping status: Never Used   Substance and Sexual Activity    Alcohol use: Yes     Alcohol/week: 0.0 oz     Comment: 1-2 per 6 months    Drug use: No    Sexual  activity: Yes     Partners: Male     Birth control/protection: Condom     Comment:    Other Topics Concern    Not on file   Social History Narrative    Not on file     Social Drivers of Health     Financial Resource Strain: Low Risk  (3/30/2025)    Overall Financial Resource Strain (CARDIA)     Difficulty of Paying Living Expenses: Not hard at all   Food Insecurity: No Food Insecurity (3/30/2025)    Hunger Vital Sign     Worried About Running Out of Food in the Last Year: Never true     Ran Out of Food in the Last Year: Never true   Transportation Needs: No Transportation Needs (3/30/2025)    PRAPARE - Transportation     Lack of Transportation (Medical): No     Lack of Transportation (Non-Medical): No   Physical Activity: Insufficiently Active (3/30/2025)    Exercise Vital Sign     Days of Exercise per Week: 1 day     Minutes of Exercise per Session: 20 min   Stress: No Stress Concern Present (3/30/2025)    [place] [employer] - Occupational Stress Questionnaire     Feeling of Stress : Only a little   Social Connections: Moderately Integrated (3/30/2025)    Social Connection and Isolation Panel [NHANES]     Frequency of Communication with Friends and Family: Three times a week     Frequency of Social Gatherings with Friends and Family: Once a week     Attends [employer] Services: 1 to 4 times per year     Active Member of Clubs or Organizations: No     Attends Club or Organization Meetings: Never     Marital Status:    Intimate Partner Violence: Not on file   Housing Stability: Low Risk  (3/30/2025)    Housing Stability Vital Sign     Unable to Pay for Housing in the Last Year: No     Number of Times Moved in the Last Year: 0     Homeless in the Last Year: No       Patient Active Problem List    Diagnosis Date Noted    Vertigo 11/14/2022    Pituitary abnormality (HCC) 11/14/2022    Proctalgia fugax 08/07/2018    Type 2 diabetes mellitus without complication, without long-term  "current use of insulin (HCC) 08/07/2018    Health care maintenance 06/28/2018    Obesity (BMI 30.0-34.9) 06/28/2018    Ovarian retention cyst 09/29/2016         Current Outpatient Medications   Medication Sig Dispense Refill    Continuous Glucose Sensor (FREESTYLE RICO 14 DAY SENSOR) [facility] Use to check blood sugar 2 Each 0     No current facility-administered medications for this visit.     No Known Allergies      Objective:     /72 (BP Location: Left arm, Patient Position: Sitting, BP Cuff Size: Adult)   Pulse 74   Temp 35.9 °C (96.6 °F) (Temporal)   Ht 1.676 m (5' 6\")   Wt 92.8 kg (204 lb 9.6 oz)   SpO2 100%   BMI 33.02 kg/m²   Body mass index is 33.02 kg/m².  Wt Readings from Last 4 Encounters:   04/01/25 92.8 kg (204 lb 9.6 oz)   02/15/25 91.6 kg (202 lb)   06/13/23 92 kg (202 lb 13.2 oz)   11/14/22 95 kg (209 lb 7 oz)       Physical Exam:  Constitutional: Well-developed and well-nourished. Not diaphoretic. No distress.   Skin: Skin is warm and dry. No rash noted.  Head: Atraumatic without lesions.  Eyes: Conjunctivae and extraocular motions are normal. Pupils are equal, round, and reactive to light. No scleral icterus.   Ears:  External ears unremarkable. Tympanic membranes clear and intact.  Nose: Nares patent. Septum midline. Turbinates without erythema nor edema. No discharge.   Mouth/Throat: Dentition is normal. Tongue normal. Oropharynx is clear and moist. Posterior pharynx without erythema or exudates.  Neck: Supple, trachea midline. Normal range of motion. No thyromegaly present. No lymphadenopathy--cervical or supraclavicular.  Cardiovascular: Regular rate and rhythm, S1 and S2 without murmur, rubs, or gallops.  Lungs: Normal inspiratory effort, CTA bilaterally, no wheezes/rhonchi/rales  Abdomen: Soft, non tender, and without distention. Active bowel sounds in all four quadrants. No rebound, guarding, masses or HSM.  Extremities: No cyanosis, clubbing, erythema, nor edema. Distal pulses " intact and symmetric.   Musculoskeletal: All major joints AROM full in all directions without pain.  Psychiatric:  Behavior, mood, and affect are appropriate.      Assessment and Plan:     1. Annual physical exam        2. Type 2 diabetes mellitus without complication, without long-term current use of insulin (HCC)  Diabetic Monofilament LE Exam    HEMOGLOBIN A1C    Continuous Glucose Sensor (FREESTYLE RICO 14 DAY SENSOR) Misc      3. Transaminitis  Comp Metabolic Panel      4. Elevated LDL cholesterol level  Lipid Profile      5. Immunization due  Tdap =>8yo IM    Pneumococcal Conjugate Vaccine 20-Valent (6 wks+)      6. Encounter for screening mammogram for malignant neoplasm of breast  MA-SCREENING MAMMO BILAT W/TOMOSYNTHESIS W/CAD      7. Colon cancer screening  Referral to GI for Colonoscopy        We discussed routine screenings and vaccinations, healthy diet and exercise, return for annual exam in 1 year.  Repeat labs in 3 months and schedule follow-up.    Please note that this dictation was created using voice recognition software. I have made every reasonable attempt to correct obvious errors, but I expect that there are errors of grammar and possibly content that I did not discover before finalizing the note.      Follow-up: No follow-ups on file.

## 2025-04-01 NOTE — LETTER
[employer]  Maria Del Carmen Ayon M.D.  [address]  [place] [address]  Fax: [phone]   Authorization for Release/Disclosure of   Protected Health Information   Name: FIFI MORA : 1980 SSN: [id number]   Address: [address]  [place] [address] Phone:    There are no phone numbers on file.   I authorize the entity listed below to release/disclose the PHI below to:   [facility]/Maria Del Carmen Ayon M.D. and Maria Del Carmen Ayon M.D.   Provider or Entity Name:  [facility]   Address   City, State, Zip   Phone:      Fax:     Reason for request: continuity of care   Information to be released:    [  ] LAST COLONOSCOPY,  including any PATH REPORT and follow-up  [  ] LAST FIT/COLOGUARD RESULT [  ] LAST DEXA  [  ] LAST MAMMOGRAM  [  ] LAST PAP  [  ] LAST LABS [ XX ] RETINA EXAM REPORT  [  ] IMMUNIZATION RECORDS  [  ] Release all info      [  ] Check here and initial the line next to each item to release ALL health information INCLUDING  _____ Care and treatment for drug and / or alcohol abuse  _____ HIV testing, infection status, or AIDS  _____ Genetic Testing    DATES OF SERVICE OR TIME PERIOD TO BE DISCLOSED: _____________  I understand and acknowledge that:  * This Authorization may be revoked at any time by you in writing, except if your health information has already been used or disclosed.  * Your health information that will be used or disclosed as a result of you signing this authorization could be re-disclosed by the recipient. If this occurs, your re-disclosed health information may no longer be protected by State or Federal laws.  * You may refuse to sign this Authorization. Your refusal will not affect your ability to obtain treatment.  * This Authorization becomes effective upon signing and will  on (date) __________.      If no date is indicated, this Authorization will  one (1) year from the signature date.    Name: Fifi  Lina  Signature: Date:   4/1/2025     PLEASE FAX REQUESTED RECORDS BACK TO: [phone]

## 2025-04-04 NOTE — Clinical Note
REFERRAL APPROVAL NOTICE         Sent on April 4, 2025                   Izzy Mills  [address]  [place] [address]                   Dear Ms. Mills,    After a careful review of the medical information and benefit coverage, [facility] has processed your referral. See below for additional details.    If applicable, you must be actively enrolled with your insurance for coverage of the authorized service. If you have any questions regarding your coverage, please contact your insurance directly.    REFERRAL INFORMATION   Referral #:  [id number]  Referred-To Provider    Referred-By Provider:  Gastroenterology    Maria Del Carmen Ayon M.D.   [facility]      [address]  [address]  [facility] [address]  [phone] [id number]  [facility] [id number]  [phone]    Referral Start Date:  04/01/2025  Referral End Date:   04/01/2026             SCHEDULING  If you do not already have an appointment, please call [phone] to make an appointment.     MORE INFORMATION  If you do not already have a [employer] account, sign up at: [email or web address]  You can access your medical information, make appointments, see lab results, billing information, and more.  If you have questions regarding this referral, please contact  the [facility] Referrals department at:             [phone]. Monday - Friday 8:00AM - 5:00PM.     Sincerely,    [facility]

## 2025-04-16 ENCOUNTER — APPOINTMENT (OUTPATIENT)
Dept: RADIOLOGY | Facility: MEDICAL CENTER | Age: 45
End: 2025-04-16
Attending: STUDENT IN AN ORGANIZED HEALTH CARE EDUCATION/TRAINING PROGRAM
Payer: COMMERCIAL

## 2025-04-16 DIAGNOSIS — Z12.31 ENCOUNTER FOR SCREENING MAMMOGRAM FOR MALIGNANT NEOPLASM OF BREAST: ICD-10-CM

## 2025-04-16 PROCEDURE — 77067 SCR MAMMO BI INCL CAD: CPT

## 2025-04-17 ENCOUNTER — RESULTS FOLLOW-UP (OUTPATIENT)
Dept: MEDICAL GROUP | Facility: MEDICAL CENTER | Age: 45
End: 2025-04-17
Payer: COMMERCIAL

## 2025-06-07 ENCOUNTER — HOSPITAL ENCOUNTER (OUTPATIENT)
Dept: LAB | Facility: MEDICAL CENTER | Age: 45
End: 2025-06-07
Attending: STUDENT IN AN ORGANIZED HEALTH CARE EDUCATION/TRAINING PROGRAM
Payer: COMMERCIAL

## 2025-06-07 DIAGNOSIS — E11.9 TYPE 2 DIABETES MELLITUS WITHOUT COMPLICATION, WITHOUT LONG-TERM CURRENT USE OF INSULIN (HCC): ICD-10-CM

## 2025-06-07 DIAGNOSIS — R74.01 TRANSAMINITIS: ICD-10-CM

## 2025-06-07 DIAGNOSIS — E78.00 ELEVATED LDL CHOLESTEROL LEVEL: ICD-10-CM

## 2025-06-07 LAB
ALBUMIN SERPL BCP-MCNC: 3.6 G/DL (ref 3.2–4.9)
ALBUMIN/GLOB SERPL: 1.1 G/DL
ALP SERPL-CCNC: 108 U/L (ref 30–99)
ALT SERPL-CCNC: 25 U/L (ref 2–50)
ANION GAP SERPL CALC-SCNC: 10 MMOL/L (ref 7–16)
AST SERPL-CCNC: 27 U/L (ref 12–45)
BILIRUB SERPL-MCNC: 0.6 MG/DL (ref 0.1–1.5)
BUN SERPL-MCNC: 9 MG/DL (ref 8–22)
CALCIUM ALBUM COR SERPL-MCNC: 9.2 MG/DL (ref 8.5–10.5)
CALCIUM SERPL-MCNC: 8.9 MG/DL (ref 8.5–10.5)
CHLORIDE SERPL-SCNC: 105 MMOL/L (ref 96–112)
CHOLEST SERPL-MCNC: 168 MG/DL (ref 100–199)
CO2 SERPL-SCNC: 22 MMOL/L (ref 20–33)
CREAT SERPL-MCNC: 0.84 MG/DL (ref 0.5–1.4)
EST. AVERAGE GLUCOSE BLD GHB EST-MCNC: 140 MG/DL
GFR SERPLBLD CREATININE-BSD FMLA CKD-EPI: 87 ML/MIN/1.73 M 2
GLOBULIN SER CALC-MCNC: 3.4 G/DL (ref 1.9–3.5)
GLUCOSE SERPL-MCNC: 120 MG/DL (ref 65–99)
HBA1C MFR BLD: 6.5 % (ref 4–5.6)
HDLC SERPL-MCNC: 50 MG/DL
LDLC SERPL CALC-MCNC: 91 MG/DL
POTASSIUM SERPL-SCNC: 4.2 MMOL/L (ref 3.6–5.5)
PROT SERPL-MCNC: 7 G/DL (ref 6–8.2)
SODIUM SERPL-SCNC: 137 MMOL/L (ref 135–145)
TRIGL SERPL-MCNC: 133 MG/DL (ref 0–149)

## 2025-06-07 PROCEDURE — 80061 LIPID PANEL: CPT

## 2025-06-07 PROCEDURE — 36415 COLL VENOUS BLD VENIPUNCTURE: CPT

## 2025-06-07 PROCEDURE — 80053 COMPREHEN METABOLIC PANEL: CPT

## 2025-06-07 PROCEDURE — 83036 HEMOGLOBIN GLYCOSYLATED A1C: CPT

## 2025-07-01 ENCOUNTER — APPOINTMENT (OUTPATIENT)
Dept: MEDICAL GROUP | Facility: MEDICAL CENTER | Age: 45
End: 2025-07-01
Payer: COMMERCIAL

## 2025-07-01 VITALS
HEIGHT: 66 IN | BODY MASS INDEX: 33.04 KG/M2 | HEART RATE: 69 BPM | TEMPERATURE: 97 F | SYSTOLIC BLOOD PRESSURE: 94 MMHG | DIASTOLIC BLOOD PRESSURE: 58 MMHG | WEIGHT: 205.6 LBS | OXYGEN SATURATION: 98 %

## 2025-07-01 DIAGNOSIS — E11.9 TYPE 2 DIABETES MELLITUS WITHOUT COMPLICATION, WITHOUT LONG-TERM CURRENT USE OF INSULIN (HCC): ICD-10-CM

## 2025-07-01 DIAGNOSIS — E78.00 PURE HYPERCHOLESTEROLEMIA: ICD-10-CM

## 2025-07-01 PROCEDURE — 99214 OFFICE O/P EST MOD 30 MIN: CPT | Performed by: STUDENT IN AN ORGANIZED HEALTH CARE EDUCATION/TRAINING PROGRAM

## 2025-07-01 PROCEDURE — 3074F SYST BP LT 130 MM HG: CPT | Performed by: STUDENT IN AN ORGANIZED HEALTH CARE EDUCATION/TRAINING PROGRAM

## 2025-07-01 PROCEDURE — 3078F DIAST BP <80 MM HG: CPT | Performed by: STUDENT IN AN ORGANIZED HEALTH CARE EDUCATION/TRAINING PROGRAM

## 2025-07-01 NOTE — PROGRESS NOTES
"Subjective:     CC: Diabetes, hyperlipidemia    HPI:   Izzy presents today with    Problem   Pure Hypercholesterolemia    This is a chronic condition.  She previously had elevated LDL cholesterol, she has been able to bring this down to the normal range without medications.    Lab Results   Component Value Date/Time    CHOLSTRLTOT 168 06/07/2025 10:50 AM    LDL 91 06/07/2025 10:50 AM    HDL 50 06/07/2025 10:50 AM    TRIGLYCERIDE 133 06/07/2025 10:50 AM       Lab Results   Component Value Date/Time    SODIUM 137 06/07/2025 10:50 AM    POTASSIUM 4.2 06/07/2025 10:50 AM    CHLORIDE 105 06/07/2025 10:50 AM    CO2 22 06/07/2025 10:50 AM    GLUCOSE 120 (H) 06/07/2025 10:50 AM    BUN 9 06/07/2025 10:50 AM    CREATININE 0.84 06/07/2025 10:50 AM     Lab Results   Component Value Date/Time    ALKPHOSPHAT 108 (H) 06/07/2025 10:50 AM    ASTSGOT 27 06/07/2025 10:50 AM    ALTSGPT 25 06/07/2025 10:50 AM    TBILIRUBIN 0.6 06/07/2025 10:50 AM           Type 2 Diabetes Mellitus Without Complication, Without Long-Term Current Use of Insulin (Hcc)    This is a chronic condition, currently at goal with an A1c of 6.5.  She was able to bring this down from 7.5 without medications.  She is up-to-date with a urine microalbumin.  She is up-to-date with her foot exam and eye exam.         ROS:  ROS    Objective:     Exam:  BP 94/58 (BP Location: Left arm, Patient Position: Sitting, BP Cuff Size: Adult)   Pulse 69   Temp 36.1 °C (97 °F) (Temporal)   Ht 1.676 m (5' 6\")   Wt 93.3 kg (205 lb 9.6 oz)   SpO2 98%   BMI 33.18 kg/m²  Body mass index is 33.18 kg/m².    Physical Exam  Vitals reviewed.   Constitutional:       General: She is not in acute distress.     Appearance: She is not toxic-appearing.   HENT:      Head: Normocephalic and atraumatic.      Right Ear: External ear normal.      Left Ear: External ear normal.   Eyes:      General:         Right eye: No discharge.         Left eye: No discharge.      Extraocular Movements: " Extraocular movements intact.      Conjunctiva/sclera: Conjunctivae normal.   Pulmonary:      Effort: Pulmonary effort is normal. No respiratory distress.   Skin:     General: Skin is warm and dry.   Neurological:      Mental Status: She is alert.   Psychiatric:         Mood and Affect: Mood normal.         Behavior: Behavior normal.         Thought Content: Thought content normal.         Judgment: Judgment normal.           Assessment & Plan:     44 y.o. female with the following -     1. Type 2 diabetes mellitus without complication, without long-term current use of insulin (HCC)  A1c is at goal without the addition of medications, recheck in 6 months, continue with exercise and healthy diet.  Microalbumin due in 6 months as  - HEMOGLOBIN A1C; Future  - MICROALBUMIN CREAT RATIO URINE; Future    2. Pure hypercholesterolemia  Chronic condition, currently at goal without the addition of medications, recheck labs in 6 months  - Lipid Profile; Future  - Comp Metabolic Panel; Future    No follow-ups on file.    Please note that this dictation was created using voice recognition software. I have made every reasonable attempt to correct obvious errors, but I expect that there are errors of grammar and possibly content that I did not discover before finalizing the note.